# Patient Record
Sex: MALE | Race: WHITE | Employment: FULL TIME | URBAN - METROPOLITAN AREA
[De-identification: names, ages, dates, MRNs, and addresses within clinical notes are randomized per-mention and may not be internally consistent; named-entity substitution may affect disease eponyms.]

---

## 2017-11-13 ENCOUNTER — OFFICE VISIT (OUTPATIENT)
Dept: ORTHOPEDIC SURGERY | Age: 47
End: 2017-11-13

## 2017-11-13 VITALS — HEIGHT: 73 IN | BODY MASS INDEX: 21.59 KG/M2 | WEIGHT: 162.92 LBS

## 2017-11-13 DIAGNOSIS — R52 PAIN: Primary | ICD-10-CM

## 2017-11-13 DIAGNOSIS — M19.011 ARTHRITIS OF RIGHT ACROMIOCLAVICULAR JOINT: ICD-10-CM

## 2017-11-13 DIAGNOSIS — M75.101 TEAR OF RIGHT ROTATOR CUFF, UNSPECIFIED TEAR EXTENT: ICD-10-CM

## 2017-11-13 PROCEDURE — G8484 FLU IMMUNIZE NO ADMIN: HCPCS | Performed by: ORTHOPAEDIC SURGERY

## 2017-11-13 PROCEDURE — G8420 CALC BMI NORM PARAMETERS: HCPCS | Performed by: ORTHOPAEDIC SURGERY

## 2017-11-13 PROCEDURE — G8427 DOCREV CUR MEDS BY ELIG CLIN: HCPCS | Performed by: ORTHOPAEDIC SURGERY

## 2017-11-13 PROCEDURE — 99203 OFFICE O/P NEW LOW 30 MIN: CPT | Performed by: ORTHOPAEDIC SURGERY

## 2017-11-13 PROCEDURE — 1036F TOBACCO NON-USER: CPT | Performed by: ORTHOPAEDIC SURGERY

## 2017-11-13 NOTE — PROGRESS NOTES
file.    REVIEW OF SYSTEMS:   For new problems, a full review of systems will be found scanned in the patient's chart. CONSTITUTIONAL: Denies unexplained weight loss, fevers, chills   NEUROLOGICAL: Denies unsteady gait or progressive weakness  SKIN: Denies skin changes, delayed healing, rash, itching       PHYSICAL EXAM:    Vitals: Height 6' 0.99\" (1.854 m), weight 162 lb 14.7 oz (73.9 kg). GENERAL EXAM:  · General Apparence: Patient is adequately groomed with no evidence of malnutrition. · Orientation: The patient is oriented to time, place and person. · Mood & Affect:The patient's mood and affect are appropriate       Right shoulder PHYSICAL EXAMINATION:  · Inspection:  The patient is obvious a.c. joint arthritis with a bump you. He also has a small subacromial cyst.  The patient has a 2013 distal biceps tendon repair seems that some asymmetry of his biceps muscles. · Palpation:  Tender to palpation over both masses. · Range of Motion: He is got good range of motion with abduction external rotation and abduction internal rotation as well. · Strength: Normal    · Special Tests:  Mildly positive supraspinous sign. Clearly positive crossarm test.  Negative apprehension sign. · Skin:  There are no rashes, ulcerations or lesions. · Gait & station:       · Additional Examinations:        Left Upper Extremity: Examination of the left upper extremity does not show any tenderness, deformity or injury. Range of motion is unremarkable. There is no gross instability. There are no rashes, ulcerations or lesions. Strength and tone are normal.      Diagnostic Testing:      3 x-ray views of the right shoulder demonstrates fairly severe a.c. joint arthritis. The humeral joint is intact. Type II acromion process morphology.     Orders     Orders Placed This Encounter   Procedures    XR SHOULDER RIGHT (MIN 2 VIEWS)     [id number]     Order Specific Question:   Reason for exam:     Answer:   Pain Assessment / Treatment Plan:     1. Chronic right rotator cuff tendinitis with significant a.c. joint arthritis and a small anterior subacromial cyst.    2.  We discussed the findings at length. We'll going to go ahead and do an MRI scan of the right shoulder for further evaluation. 3. I have personally performed and/or participated in the history, exam and medical decision making and agree with all pertinent clinical information. I have also reviewed and agree with the past medical, family and social history unless otherwise noted. This dictation was performed with a verbal recognition program (DRAGON) and it was checked for errors. It is possible that there are still dictated errors within this office note. If so, please bring any errors to my attention for an addendum. All efforts were made to ensure that this office note is accurate.           Perez Goldman MD

## 2017-11-14 ENCOUNTER — TELEPHONE (OUTPATIENT)
Dept: ORTHOPEDIC SURGERY | Age: 47
End: 2017-11-14

## 2017-11-14 NOTE — TELEPHONE ENCOUNTER
CALLED PATIENT TODAY, STATING MRI IS APPROVED FOR [facility], & LEFT # FOR THEM TO CALL & SCHEDULE THAT. & TO MAKE AN FOLLOW UP APPT W/ DR. UGALDE AFTER MRI.  LNA

## 2017-11-20 ENCOUNTER — OFFICE VISIT (OUTPATIENT)
Dept: ORTHOPEDIC SURGERY | Age: 47
End: 2017-11-20

## 2017-11-20 VITALS
SYSTOLIC BLOOD PRESSURE: 123 MMHG | HEART RATE: 78 BPM | HEIGHT: 73 IN | DIASTOLIC BLOOD PRESSURE: 70 MMHG | BODY MASS INDEX: 21.59 KG/M2 | WEIGHT: 162.92 LBS

## 2017-11-20 DIAGNOSIS — M77.8 CAPSULITIS OF RIGHT SHOULDER: ICD-10-CM

## 2017-11-20 DIAGNOSIS — M19.011 ARTHRITIS OF RIGHT ACROMIOCLAVICULAR JOINT: Primary | ICD-10-CM

## 2017-11-20 DIAGNOSIS — M19.011 ARTHRITIS OF RIGHT SHOULDER REGION: Primary | ICD-10-CM

## 2017-11-20 DIAGNOSIS — M77.8 TENDINITIS OF RIGHT SHOULDER: ICD-10-CM

## 2017-11-20 PROCEDURE — G8420 CALC BMI NORM PARAMETERS: HCPCS | Performed by: ORTHOPAEDIC SURGERY

## 2017-11-20 PROCEDURE — G8484 FLU IMMUNIZE NO ADMIN: HCPCS | Performed by: ORTHOPAEDIC SURGERY

## 2017-11-20 PROCEDURE — L3670 SO ACRO/CLAV CAN WEB PRE OTS: HCPCS | Performed by: ORTHOPAEDIC SURGERY

## 2017-11-20 PROCEDURE — 99213 OFFICE O/P EST LOW 20 MIN: CPT | Performed by: ORTHOPAEDIC SURGERY

## 2017-11-20 PROCEDURE — 1036F TOBACCO NON-USER: CPT | Performed by: ORTHOPAEDIC SURGERY

## 2017-11-20 PROCEDURE — G8427 DOCREV CUR MEDS BY ELIG CLIN: HCPCS | Performed by: ORTHOPAEDIC SURGERY

## 2017-11-21 ENCOUNTER — TELEPHONE (OUTPATIENT)
Dept: ORTHOPEDIC SURGERY | Age: 47
End: 2017-11-21

## 2017-11-28 ENCOUNTER — HOSPITAL ENCOUNTER (OUTPATIENT)
Dept: SURGERY | Age: 47
Discharge: HOME | End: 2017-11-28
Attending: ORTHOPAEDIC SURGERY | Admitting: ORTHOPAEDIC SURGERY

## 2017-11-28 VITALS
OXYGEN SATURATION: 94 % | HEIGHT: 73 IN | DIASTOLIC BLOOD PRESSURE: 68 MMHG | BODY MASS INDEX: 23.19 KG/M2 | TEMPERATURE: 97 F | HEART RATE: 78 BPM | RESPIRATION RATE: 14 BRPM | WEIGHT: 175 LBS | SYSTOLIC BLOOD PRESSURE: 120 MMHG

## 2017-11-28 RX ORDER — ONDANSETRON 2 MG/ML
4 INJECTION INTRAMUSCULAR; INTRAVENOUS EVERY 10 MIN PRN
Status: DISCONTINUED | OUTPATIENT
Start: 2017-11-28 | End: 2017-11-29 | Stop reason: HOSPADM

## 2017-11-28 RX ORDER — LABETALOL HYDROCHLORIDE 5 MG/ML
5 INJECTION, SOLUTION INTRAVENOUS EVERY 10 MIN PRN
Status: DISCONTINUED | OUTPATIENT
Start: 2017-11-28 | End: 2017-11-29 | Stop reason: HOSPADM

## 2017-11-28 RX ORDER — LIDOCAINE HYDROCHLORIDE 10 MG/ML
1 INJECTION, SOLUTION EPIDURAL; INFILTRATION; INTRACAUDAL; PERINEURAL
Status: ACTIVE | OUTPATIENT
Start: 2017-11-28 | End: 2017-11-28

## 2017-11-28 RX ORDER — MEPERIDINE HYDROCHLORIDE 50 MG/ML
12.5 INJECTION INTRAMUSCULAR; INTRAVENOUS; SUBCUTANEOUS EVERY 5 MIN PRN
Status: DISCONTINUED | OUTPATIENT
Start: 2017-11-28 | End: 2017-11-29 | Stop reason: HOSPADM

## 2017-11-28 RX ORDER — SODIUM CHLORIDE, SODIUM LACTATE, POTASSIUM CHLORIDE, CALCIUM CHLORIDE 600; 310; 30; 20 MG/100ML; MG/100ML; MG/100ML; MG/100ML
INJECTION, SOLUTION INTRAVENOUS CONTINUOUS
Status: DISCONTINUED | OUTPATIENT
Start: 2017-11-28 | End: 2017-11-29 | Stop reason: HOSPADM

## 2017-11-28 RX ORDER — HYDROCODONE BITARTRATE AND ACETAMINOPHEN 5; 325 MG/1; MG/1
TABLET ORAL
Qty: 40 TABLET | Refills: 0 | Status: SHIPPED | OUTPATIENT
Start: 2017-11-28

## 2017-11-28 RX ORDER — MIDAZOLAM HYDROCHLORIDE 1 MG/ML
INJECTION INTRAMUSCULAR; INTRAVENOUS
Status: DISPENSED
Start: 2017-11-28 | End: 2017-11-28

## 2017-11-28 RX ORDER — HYDRALAZINE HYDROCHLORIDE 20 MG/ML
5 INJECTION INTRAMUSCULAR; INTRAVENOUS EVERY 10 MIN PRN
Status: DISCONTINUED | OUTPATIENT
Start: 2017-11-28 | End: 2017-11-29 | Stop reason: HOSPADM

## 2017-11-28 RX ORDER — TRAMADOL HYDROCHLORIDE 50 MG/1
50 TABLET ORAL EVERY 6 HOURS PRN
Qty: 40 TABLET | Refills: 0 | Status: SHIPPED | OUTPATIENT
Start: 2017-11-28 | End: 2017-12-13

## 2017-11-28 RX ADMIN — SODIUM CHLORIDE, SODIUM LACTATE, POTASSIUM CHLORIDE, CALCIUM CHLORIDE: 600; 310; 30; 20 INJECTION, SOLUTION INTRAVENOUS at 08:13

## 2017-11-28 RX ADMIN — ONDANSETRON 4 MG: 2 INJECTION INTRAMUSCULAR; INTRAVENOUS at 11:03

## 2017-11-28 ASSESSMENT — PAIN SCALES - GENERAL
PAINLEVEL_OUTOF10: 0

## 2017-11-28 ASSESSMENT — PAIN - FUNCTIONAL ASSESSMENT: PAIN_FUNCTIONAL_ASSESSMENT: 0-10

## 2017-11-28 NOTE — PROGRESS NOTES
Discharge in no distress: accompanied pt to passenger side of car with family/significant other driving. Resp WNL. Pt's significant other verbalized understanding of d/c instructions and verbalizes no additional questions. Denies pain.

## 2017-11-28 NOTE — ANESTHESIA PRE-OP
BP Readings from Last 3 Encounters:   11/28/17 111/79   11/20/17 123/70   08/31/13 114/85       NPO Status: Time of last liquid consumption: 2130                        Time of last solid consumption: 1900                        Date of last liquid consumption: 11/27/17                        Date of last solid food consumption: 11/27/17    BMI:   Wt Readings from Last 3 Encounters:   11/28/17 175 lb (79.4 kg)   11/21/17 175 lb (79.4 kg)   11/20/17 162 lb 14.7 oz (73.9 kg)     Body mass index is 23.09 kg/m². Anesthesia Evaluation  Patient summary reviewed   history of anesthetic complications: PONV. Airway: Mallampati: II  TM distance: >3 FB   Neck ROM: full  Mouth opening: > = 3 FB Dental: normal exam         Pulmonary:Negative Pulmonary ROS                              Cardiovascular:Negative CV ROS                      Neuro/Psych:   Negative Neuro/Psych ROS              GI/Hepatic/Renal: Neg GI/Hepatic/Renal ROS       (-) GERD and liver disease       Endo/Other: Negative Endo/Other ROS       (-) no Type II DM               Abdominal:           Vascular: negative vascular ROS. Medications:    Comments:    Prior to Admission medications    Not on File       Vitals   Vitals:    11/28/17 0817   BP: 111/79   Pulse: 65   Resp: 16   Temp: 97.2 °F (36.2 °C)   SpO2: 97%     BP Readings from Last 3 Encounters:   11/28/17 111/79   11/20/17 123/70   08/31/13 114/85     BMI  Ht Readings from Last 1 Encounters:   11/28/17 6' 1\" (1.854 m)     Wt Readings from Last 1 Encounters:   11/28/17 175 lb (79.4 kg)     Body mass index is 23.09 kg/m².   CBC   Lab Results   Component Value Date    WBC 13.3 08/31/2013    RBC 4.31 08/31/2013    HGB 13.5 08/31/2013    HCT 39.7 08/31/2013    MCV 92.1 08/31/2013    RDW 12.0 08/31/2013     08/31/2013     CMP    Lab Results   Component Value Date     08/31/2013    K 3.9 08/31/2013     08/31/2013

## 2017-11-28 NOTE — BRIEF OP NOTE
Brief Postoperative Note    Laura Cavanaugh  YOB: 1970  [id number]    Pre-operative Diagnosis: Right shoulder RTC tendonitis    Post-operative Diagnosis: Same    Procedure: Right shoulder arthroscopy, open removal small cyst    Anesthesia: General and Nerve Block    Surgeons/Assistants: Atul San MD, Liam JESUS    Estimated Blood Loss: less than 50     Complications: None    Specimens: Was Not Obtained    Findings: tendonitis    Electronically signed by ANN MARIE Robison on 11/28/2017 at 10:26 AM

## 2017-11-28 NOTE — PROGRESS NOTES
Wife at bedside discharge instructions already reviewed by Marco Lema with no questions. Patient sleeping no emesis.

## 2017-11-28 NOTE — ANESTHESIA POST-OP
Postoperative Anesthesia Note    Name:    Rafiq Alfaro  MRN:      [id number]    Patient Vitals for the past 12 hrs:   BP Temp Temp src Pulse Resp SpO2 Height Weight   11/28/17 1115 120/68 - - 78 14 94 % - -   11/28/17 1100 138/78 97 °F (36.1 °C) - 77 17 93 % - -   11/28/17 1042 130/70 97.5 °F (36.4 °C) - 82 16 93 % - -   11/28/17 1037 133/73 - - 83 16 92 % - -   11/28/17 1032 138/72 - - 86 16 92 % - -   11/28/17 1027 (!) 140/92 97.4 °F (36.3 °C) Temporal 85 16 92 % - -   11/28/17 0817 111/79 97.2 °F (36.2 °C) Temporal 65 16 97 % 6' 1\" (1.854 m) 175 lb (79.4 kg)        LABS:    CBC  Lab Results   Component Value Date/Time    WBC 13.3 (H) 08/31/2013 11:20 PM    HGB 13.5 08/31/2013 11:20 PM    HCT 39.7 (L) 08/31/2013 11:20 PM     08/31/2013 11:20 PM     RENAL  Lab Results   Component Value Date/Time     08/31/2013 11:20 PM    K 3.9 08/31/2013 11:20 PM     08/31/2013 11:20 PM    CO2 25 08/31/2013 11:20 PM    BUN 16 08/31/2013 11:20 PM    CREATININE 1.2 08/31/2013 11:20 PM    GLUCOSE 106 (H) 08/31/2013 11:20 PM     COAGS  No results found for: PROTIME, INR, APTT    Intake & Output: In: 80 [P.O.:10; I.V.:800]  Out: -     Nausea & Vomiting:  No    Level of Consciousness:  Awake    Pain Assessment:  Adequate analgesia    Anesthesia Complications:  No apparent anesthetic complications    SUMMARY      Vital signs stable  OK to discharge from Stage I post anesthesia care.   Care transferred from Anesthesiology department on discharge from perioperative area

## 2017-11-29 NOTE — OP NOTE
[address] [address]                                 OPERATIVE REPORT    PATIENT NAME: Kaila De La Cruz                        :        1970  MED REC NO:   [id number]                          ROOM:  ACCOUNT NO:   [de-identified]                          ADMIT DATE: 2017  PROVIDER:     Luis E Frances MD    DATE OF PROCEDURE:  2017    SERVICE:  Orthopedic surgery. SURGEON:  Bentley Morillo MD    FIRST ASSISTANT:  ANN MARIE Castillo    PREOPERATIVE DIAGNOSES:  1. Chronic rotator cuff tendinitis with impingement of the AC joint  arthritis. 2.  Small cyst/possible lipoma, right anterior shoulder. POSTOPERATIVE DIAGNOSES:  1. Chronic rotator cuff tendinitis with lateral arch stenosis. 2.  Severe AC joint arthritis of medial arch stenosis. 3.  Frayed anterior glenoid labrum and superior glenoid labrum. 4.  Small lipoma, anterior shoulder. OPERATION PERFORMED:  1. Exam under anesthesia and video arthroscopy, right shoulder. 2.  Arthroscopic Neer acromioplasty. 3.  Arthroscopic Compa procedure. 4.  Limited debridement of the shoulder including frayed anterior and  superior glenoid labrum without instability pattern. 5.  Open excision of lipoma, anterior aspect of the shoulder approximately  1 x 1 cm. ANESTHESIA:  General.    COMPLICATIONS:  None. COUNTS:  Sponge and needle counts correct. DISPOSITION:  To the recovery room. ESTIMATED BLOOD LOSS:  Negligible. POSITION:  Left lateral decubitus with Vac-Pac, superficial bony prominence  carefully padded and axillary roll. A 10 pounds longitudinal traction on  the Ronci system. INDICATIONS FOR SURGERY:  The patient is a 70-year-old gentleman, who has  been struggling with ongoing right shoulder pain. His clinical examination  and MRI scan was consistent with the aforementioned diagnosis.   He had  significant AC joint arthritis without evidence of a full-thickness rotator  cuff tear. He also had a small mass on the anterior aspect of his shoulder  that appeared benign. It is probably either cyst or lipoma and he  requested this to be removed. At the time of the surgery, I told him that  this could be performed. We discussed the risks, benefits, and potential  complications of the operation as well as a normal rehabilitative protocol. He realized concerns regarding infection, deep vein thrombosis, pulmonary  embolism, arthrofibrosis, delayed rehabilitation, anesthetic complications,  death, cardiopulmonary issues, etc.  All questions were answered. I did  blas this man's shoulder in the preprocedure holding area and circled what  was felt to be the mass. DETAILS OF SURGERY:  The patient was taken to the operating room and placed  on the operating table in supine position. General anesthesia was induced  and maintained without difficulty. Examination under anesthesia did not  demonstrate any signs of adhesive capsulitis or instability. The shoulder  was then positioned and prepped and draped in a routine fashion for this  type of surgery. We excised the cyst first.  A longitudinal incision was  made over the mass. This was carried down through the skin and soft  tissues to expose the small lipoma. I utilized a Humphries tenotomy scissors  to dissect around the lipoma and removed this off the fascia of the deltoid  without difficulty. This totally benign in appearance. Vicryl was then utilized to close this. At this point, the arthroscopic  portals were established and the glenohumeral joint was entered. The  glenohumeral articular surface was okay, but the patient did have the  extensor fraying of the anterior labrum and the superior labrum. Each of  these areas were debrided utilizing a 4.0 full-radius resector through an  anterior portal.  The biceps and biceps anchor was intact.   There was no  Hill-Sachs lesion and the

## 2017-11-30 ENCOUNTER — HOSPITAL ENCOUNTER (OUTPATIENT)
Dept: PHYSICAL THERAPY | Age: 47
Discharge: HOME | End: 2017-11-30
Admitting: ORTHOPAEDIC SURGERY

## 2017-11-30 NOTE — FLOWSHEET NOTE
[address] [facility]    Physical Therapy Daily Treatment Note  Date:  2017    Patient Name:  Javon Joy    :  1970  MRN: [id number]  Medical/Treatment Diagnosis Information:  · Diagnosis: R shoulder Compa / Neer procedure on 17  · Treatment Diagnosis: W58.53  Insurance/Certification information:  PT Insurance Information: [facility]  Physician Information:  Referring Practitioner: Jessica Garcia of care signed (Y/N):     Date of Patient follow up with Physician:  17    G-Code (if applicable):      Date G-Code Applied:  2017  PT G-Codes  Functional Assessment Tool Used: Norman Patel  Score: 100%  Functional Limitation: Carrying, moving and handling objects  Carrying, Moving and Handling Objects Current Status (): 100 percent impaired, limited or restricted  Carrying, Moving and Handling Objects Goal Status (): 0 percent impaired, limited or restricted    Progress Note: [x]  Yes  []  No      Latex Allergy:  [x]NO      []YES   Preferred Language for Healthcare:   [x]English       []other:     Visit # Insurance Allowable   1 Med nec     Pain level:  7/10     SUBJECTIVE:  See eval    OBJECTIVE: See eval  Observation:   Test measurements:    Patient educated on following:    RESTRICTIONS/PRECAUTIONS:     Exercises/Interventions:   Therapeutic Ex Wt/Sets/Reps/Hold Notes   Pulley                         Shrugs/pinches x20 ea    Self passive flexion/ER x20 ea    Pendulums x20 ea ??, ??, CW/CCW                                                      Manual     Gr I-II mobs for tissue reactivity     PROM-all planes     GR III-IV mobs for arthrokinematics     Cervical/long axis distraction     Thoracic PA mobs     PNF for strengthening     PNF for agonist/antagonist inhibition          Pt education 10 min Provided Education in post-operative precautions/contraindications. Provided instruction in shoulder safe zone priniciples.       Therapeutic Exercise and NMR EXR  [x] (87140) Provided verbal/tactile cueing for activities related to strengthening, flexibility, endurance, ROM  for improvements in scapular, scapulothoracic and UE control with self care, reaching, carrying, lifting, house/yardwork, driving/computer work. [x] (82996) Provided verbal/tactile cueing for activities related to improving balance, coordination, kinesthetic sense, posture, motor skill, proprioception  to assist with  scapular, scapulothoracic and UE control with self care, reaching, carrying, lifting, house/yardwork, driving/computer work.     Home Exercise Program:    [x] (37588) Reviewed/Progressed HEP activities related to strengthening, flexibility, endurance, ROM of scapular, scapulothoracic and UE control with self care, reaching, carrying, lifting, house/yardwork, driving/computer work  [x] (84210) Reviewed/Progressed HEP activities related to improving balance, coordination, kinesthetic sense, posture, motor skill, proprioception of scapular, scapulothoracic and UE control with self care, reaching, carrying, lifting, house/yardwork, driving/computer work      Manual Treatments:  PROM / STM / Oscillations-Mobs:  G-I, II, III, IV (PA's, Inf., Post.)  [x] (91905) Provided manual therapy to mobilize soft tissue/joints of cervical/CT, scapular GHJ and UE for the purpose of modulating pain, promoting relaxation,  increasing ROM, reducing/eliminating soft tissue swelling/inflammation/restriction, improving soft tissue extensibility and allowing for proper ROM for normal function with self care, reaching, carrying, lifting, house/yardwork, driving/computer work    Modalities:  Cp at home    Charges:  Timed Code Treatment Minutes: 30   Total Treatment Minutes: 50     [x] EVAL (LOW) 98054 (typically 20 minutes face-to-face)  [] EVAL (MOD) 44701 (typically 30 minutes face-to-face)  [] EVAL (HIGH) 26499 (typically 45 minutes face-to-face)  [] RE-EVAL     [x] WL(75033) x  2   [] Ionto  [] NMR (66699) x      [] Vaso  [] Manual (47808) x       [] Mech Traction  [] ES (unattended):   [] Other:     GOALS:  Therapist goals for Patient:   Short Term Goals: To be achieved in: 2 weeks  1. Independent in HEP and progression per patient tolerance, in order to prevent re-injury. 2. Patient will have a decrease in pain to facilitate improvement in movement, function, and ADLs as indicated by Functional Deficits.     Long Term Goals: To be achieved in: 12 weeks  1. Disability index score of 10% or less for the Quick Dash to assist with reaching prior level of function. 2. Patient will demonstrate increased AROM to equal the opposite side bilaterally to allow for proper joint functioning as indicated by patients Functional Deficits. 3. Patient will demonstrate an increase in strength to UE MMT scores of 5/5 to allow for proper functional mobility as indicated by patients Functional Deficits. 4. Patient will return to all transfers, work activities, and functional activities without increased symptoms or restriction. 5. Patient will have 0/10 pain with ADL's.  6. Patient stated goal: Return to work in 6-8 weeks per patient. Goals that are underlined signify the goal has been accomplished. Progression Towards Functional goals:  [] Patient is progressing as expected towards functional goals listed. [] Progression is slowed due to complexities listed. [] Progression has been slowed due to co-morbidities.   [x] Plan just implemented, too soon to assess goals progression  [] Other:     ASSESSMENT:  See eval    Treatment/Activity Tolerance:   [x] Patient tolerated treatment well [] Patient limited by fatique  [] Patient limited by pain  [] Patient limited by other medical complications  [] Other:     Prognosis: [] Good [] Fair  [] Poor    Patient Requires Follow-up: [x] Yes  [] No    PLAN: See eval  [] Continue per plan of care [] Alter current plan (see comments)  [x] Plan of care initiated [] Hold pending MD visit [] Discharge    Electronically signed by: Jaz Holt, PT

## 2017-11-30 NOTE — PLAN OF CARE
by intake and observation. Intake form has been scanned into medical record. Patient has been instructed to contact their primary care physician regarding ROS issues if not already being addressed at this time. Co-morbidities/Complexities (which will affect course of rehabilitation):   []None           Arthritic conditions   []Rheumatoid arthritis (M05.9)  []Osteoarthritis (M19.91)   Cardiovascular conditions   []Hypertension (I10)  []Hyperlipidemia (E78.5)  []Angina pectoris (I20)  []Atherosclerosis (I70)   Musculoskeletal conditions   []Disc pathology   []Congenital spine pathologies   [x]Prior surgical intervention  []Osteoporosis (M81.8)  []Osteopenia (M85.8)   Endocrine conditions   []Hypothyroid (E03.9)  []Hyperthyroid Gastrointestinal conditions   []Constipation (P47.62)   Metabolic conditions   []Morbid obesity (E66.01)  []Diabetes type 1(E10.65) or 2 (E11.65)   []Neuropathy (G60.9)     Pulmonary conditions   []Asthma (J45)  []Coughing   []COPD (J44.9)   Psychological Disorders  []Anxiety (F41.9)  []Depression (F32.9)   []Other:   []Other:          Barriers to/and or personal factors that will affect rehab potential:              []Age  []Sex              []Motivation/Lack of Motivation                        []Co-Morbidities              []Cognitive Function, education/learning barriers              []Environmental, home barriers              []profession/work barriers  []past PT/medical experience  []other:  Justification:     Falls Risk Assessment (30 days):   [x] Falls Risk assessed and no intervention required.   [] Falls Risk assessed and Patient requires intervention due to being higher risk   TUG score (>12s at risk):     [] Falls education provided, including       G-Codes:  PT G-Codes  Functional Assessment Tool Used: Quick Dash  Score: 100%  Functional Limitation: Carrying, moving and handling objects  Carrying, Moving and Handling Objects Current Status (): 100 percent impaired, limited or restricted  Carrying, Moving and Handling Objects Goal Status (): 0 percent impaired, limited or restricted    ASSESSMENT:   Functional Impairments   []Noted spinal or UE joint hypomobility   []Noted spinal or UE joint hypermobility   [x]Decreased UE functional ROM   [x]Decreased UE functional strength   [x]Abnormal reflexes/sensation/myotomal/dermatomal deficits   [x]Decreased RC/scapular/core strength and neuromuscular control   []other:      Functional Activity Limitations (from functional questionnaire and intake)   []Reduced ability to tolerate prolonged functional positions   []Reduced ability or difficulty with changes of positions or transfers between positions   []Reduced ability to maintain good posture and demonstrate good body mechanics with sitting, bending, and lifting   [x] Reduced ability or tolerance with driving and/or computer work   [x]Reduced ability to sleep   [x]Reduced ability to perform lifting, reaching, carrying tasks   [x]Reduced ability to tolerate impact through UE   [x]Reduced ability to reach behind back   [x]Reduced ability to  or hold objects   [x]Reduced ability to throw or toss an object   []other:    Participation Restrictions   []Reduced participation in self care activities   [x]Reduced participation in home management activities   [x]Reduced participation in work activities   [x]Reduced participation in social activities. []Reduced participation in sport/recreation activities. Classification:   [x]Signs/symptoms consistent with post-surgical status including decreased ROM, strength and function.   []Signs/symptoms consistent with joint sprain/strain   []Signs/symptoms consistent with shoulder impingement   []Signs/symptoms consistent with shoulder/elbow/wrist tendinopathy   []Signs/symptoms consistent with Rotator cuff tear   []Signs/symptoms consistent with labral tear   []Signs/symptoms consistent with postural dysfunction    []Signs/symptoms consistent with Glenohumeral IR Deficit - <45 degrees   []Signs/symptoms consistent with facet dysfunction of cervical/thoracic spine    []Signs/symptoms consistent with pathology which may benefit from Dry needling     []other: Tolerance of evaluation/treatment:    []Excellent   [x]Good    []Fair   []Poor    Physical Therapy Evaluation Complexity Justification   [x] A history of present problem with:  [] no personal factors and/or comorbidities that impact the plan of care;  [x]1-2 personal factors and/or comorbidities that impact the plan of care  []3 personal factors and/or comorbidities that impact the plan of care  [x] An examination of body systems using standardized tests and measures addressing any of the following: body structures and functions (impairments), activity limitations, and/or participation restrictions;:  [] a total of 1-2 or more elements   [x] a total of 3 or more elements   [] a total of 4 or more elements   [x] A clinical presentation with:  [x] stable and/or uncomplicated characteristics   [] evolving clinical presentation with changing characteristics  [] unstable and unpredictable characteristics;   [x] Clinical decision making of [x] low, [] moderate, [] high complexity using standardized patient assessment instrument and/or measurable assessment of functional outcome. [x] EVAL (LOW) 12394 (typically 20 minutes face-to-face)  [] EVAL (MOD) 20599 (typically 30 minutes face-to-face)  [] EVAL (HIGH) 90293 (typically 45 minutes face-to-face)  [] RE-EVAL     PLAN:  Frequency/Duration:  1-2 days per week for 12 weeks:  INTERVENTIONS:  1. Therapeutic exercise including: strength training, ROM, NMR and proprioception for the scapula, core and Upper extremity  2. Manual therapy as indicated including Dry Needling/IASTM, STM, PROM, Gr I-IV mobilizations, spinal mobilization/manipulation. 3. Modalities as needed including: thermal agents, E-stim, US, iontophoresis as indicated.    4. Patient education on

## 2017-12-01 ENCOUNTER — OFFICE VISIT (OUTPATIENT)
Dept: ORTHOPEDIC SURGERY | Age: 47
End: 2017-12-01

## 2017-12-01 ENCOUNTER — HOSPITAL ENCOUNTER (OUTPATIENT)
Dept: PHYSICAL THERAPY | Age: 47
Discharge: HOME | End: 2017-12-31
Attending: ORTHOPAEDIC SURGERY | Admitting: ORTHOPAEDIC SURGERY

## 2017-12-01 DIAGNOSIS — Z98.890 S/P ARTHROSCOPY OF SHOULDER: Primary | ICD-10-CM

## 2017-12-01 PROCEDURE — 99024 POSTOP FOLLOW-UP VISIT: CPT | Performed by: PHYSICIAN ASSISTANT

## 2017-12-01 NOTE — PROGRESS NOTES
History of present illness: The patient returns today for their first postoperative visit after right shoulder arthroscopy performed on 11/28/2017 with Neer acromioplasty, Compa procedure and lipoma removal. Pain control has been satisfactory with oral medications. There have been no fevers or chills. Physical examination: inspection reveals expected swelling. Incisions are clean, dry, and intact. No signs of infection. Range of motion limited by pain and swelling. The patient is neurovascularly intact. Assessment/plan:  The patient is doing well after shoulder arthroscopy. Surgical findings were reviewed with the patient. I have recommended ice, judicious use of NSAIDs with GI precautions, and physical therapy for both range of motion and strength. They can begin to wean themselves out of their sling. We will see the patient back in 3 weeks with x-rays. They've verbalized good understanding of the plan. Madeleine Patrick, [facility]    This dictation was performed with a verbal recognition program [facility]) and it was checked for errors. It is possible that there are still dictated errors within this office note. If so, please bring any errors to my attention for an addendum. All efforts were made to ensure that this office note is accurate.

## 2017-12-04 ENCOUNTER — HOSPITAL ENCOUNTER (OUTPATIENT)
Dept: PHYSICAL THERAPY | Age: 47
Discharge: HOME | End: 2017-12-04
Admitting: ORTHOPAEDIC SURGERY

## 2017-12-04 NOTE — FLOWSHEET NOTE
for improvements in scapular, scapulothoracic and UE control with self care, reaching, carrying, lifting, house/yardwork, driving/computer work. [x] (69817) Provided verbal/tactile cueing for activities related to improving balance, coordination, kinesthetic sense, posture, motor skill, proprioception  to assist with  scapular, scapulothoracic and UE control with self care, reaching, carrying, lifting, house/yardwork, driving/computer work.     Home Exercise Program:    [x] (60762) Reviewed/Progressed HEP activities related to strengthening, flexibility, endurance, ROM of scapular, scapulothoracic and UE control with self care, reaching, carrying, lifting, house/yardwork, driving/computer work  [x] (50122) Reviewed/Progressed HEP activities related to improving balance, coordination, kinesthetic sense, posture, motor skill, proprioception of scapular, scapulothoracic and UE control with self care, reaching, carrying, lifting, house/yardwork, driving/computer work      Manual Treatments:  PROM / STM / Oscillations-Mobs:  G-I, II, III, IV (PA's, Inf., Post.)  [x] (26319) Provided manual therapy to mobilize soft tissue/joints of cervical/CT, scapular GHJ and UE for the purpose of modulating pain, promoting relaxation,  increasing ROM, reducing/eliminating soft tissue swelling/inflammation/restriction, improving soft tissue extensibility and allowing for proper ROM for normal function with self care, reaching, carrying, lifting, house/yardwork, driving/computer work    Modalities:  Cp at home    Charges:  Timed Code Treatment Minutes: 45   Total Treatment Minutes: 45     [] EVAL (LOW) 20859 (typically 20 minutes face-to-face)  [] EVAL (MOD) 71834 (typically 30 minutes face-to-face)  [] EVAL (HIGH) 09191 (typically 45 minutes face-to-face)  [] RE-EVAL     [x] ZU(12928) x  2   [] Ionto  [x] NMR (05449) x  1   [] Vaso  [] Manual ([id number]) x       [] Mech Traction  [] ES (unattended):   [] Other:     GOALS:  Therapist goals for Patient:   Short Term Goals: To be achieved in: 2 weeks  1. Independent in HEP and progression per patient tolerance, in order to prevent re-injury. 2. Patient will have a decrease in pain to facilitate improvement in movement, function, and ADLs as indicated by Functional Deficits.     Long Term Goals: To be achieved in: 12 weeks  1. Disability index score of 10% or less for the Quick Dash to assist with reaching prior level of function. 2. Patient will demonstrate increased AROM to equal the opposite side bilaterally to allow for proper joint functioning as indicated by patients Functional Deficits. 3. Patient will demonstrate an increase in strength to UE MMT scores of 5/5 to allow for proper functional mobility as indicated by patients Functional Deficits. 4. Patient will return to all transfers, work activities, and functional activities without increased symptoms or restriction. 5. Patient will have 0/10 pain with ADL's.  6. Patient stated goal: Return to work in 6-8 weeks per patient. Goals that are underlined signify the goal has been accomplished. Progression Towards Functional goals:  [] Patient is progressing as expected towards functional goals listed. [] Progression is slowed due to complexities listed. [] Progression has been slowed due to co-morbidities.   [x] Plan just implemented, too soon to assess goals progression  [] Other:     ASSESSMENT:  See eval     Treatment/Activity Tolerance:   [x] Patient tolerated treatment well [] Patient limited by fatique  [] Patient limited by pain  [] Patient limited by other medical complications  [] Other:     Prognosis: [] Good [] Fair  [] Poor    Patient Requires Follow-up: [x] Yes  [] No    PLAN: See eval  [x] Continue per plan of care [] Alter current plan (see comments)  [] Plan of care initiated [] Hold pending MD visit [] Discharge    Electronically signed by: Cori Castro, PT

## 2017-12-06 ENCOUNTER — HOSPITAL ENCOUNTER (OUTPATIENT)
Dept: PHYSICAL THERAPY | Age: 47
Discharge: HOME | End: 2017-12-06
Admitting: ORTHOPAEDIC SURGERY

## 2017-12-06 NOTE — FLOWSHEET NOTE
to strengthening, flexibility, endurance, ROM  for improvements in scapular, scapulothoracic and UE control with self care, reaching, carrying, lifting, house/yardwork, driving/computer work. [x] (02792) Provided verbal/tactile cueing for activities related to improving balance, coordination, kinesthetic sense, posture, motor skill, proprioception  to assist with  scapular, scapulothoracic and UE control with self care, reaching, carrying, lifting, house/yardwork, driving/computer work.     Home Exercise Program:    [x] (26083) Reviewed/Progressed HEP activities related to strengthening, flexibility, endurance, ROM of scapular, scapulothoracic and UE control with self care, reaching, carrying, lifting, house/yardwork, driving/computer work  [x] (12510) Reviewed/Progressed HEP activities related to improving balance, coordination, kinesthetic sense, posture, motor skill, proprioception of scapular, scapulothoracic and UE control with self care, reaching, carrying, lifting, house/yardwork, driving/computer work      Manual Treatments:  PROM / STM / Oscillations-Mobs:  G-I, II, III, IV (PA's, Inf., Post.)  [x] (46270) Provided manual therapy to mobilize soft tissue/joints of cervical/CT, scapular GHJ and UE for the purpose of modulating pain, promoting relaxation,  increasing ROM, reducing/eliminating soft tissue swelling/inflammation/restriction, improving soft tissue extensibility and allowing for proper ROM for normal function with self care, reaching, carrying, lifting, house/yardwork, driving/computer work    Modalities:  Cp 10'  Charges:  Timed Code Treatment Minutes: 55   Total Treatment Minutes: 65     [] EVAL (LOW) 94022 (typically 20 minutes face-to-face)  [] EVAL (MOD) 79076 (typically 30 minutes face-to-face)  [] EVAL (HIGH) 39912 (typically 45 minutes face-to-face)  [] RE-EVAL     [x] ZF(84230) x  2   [] Ionto  [x] NMR (59991) x  1   [] Vaso  [x] Manual (82928) x  1    [] Mech Traction  [] ES

## 2017-12-11 ENCOUNTER — HOSPITAL ENCOUNTER (OUTPATIENT)
Dept: PHYSICAL THERAPY | Age: 47
Discharge: HOME | End: 2017-12-11
Admitting: ORTHOPAEDIC SURGERY

## 2017-12-11 NOTE — FLOWSHEET NOTE
[address] and [facility] [place])    Physical Therapy Daily Treatment Note  Date:  2017    Patient Name:  Federico Crawford  \"Fernando\"  :  1970  MRN: [id number]  Medical/Treatment Diagnosis Information:  · Diagnosis: R shoulder Compa / Neer procedure on 17  · Treatment Diagnosis: J89.79  Insurance/Certification information:  PT Insurance Information: [facility]  Physician Information:  Referring Practitioner: Kassidy Pate of care signed (Y/N):     Date of Patient follow up with Physician:  17    G-Code (if applicable):      Date G-Code Applied:  2017       Progress Note: [x]  Yes  []  No      Latex Allergy:  [x]NO      []YES   Preferred Language for Healthcare:   [x]English       []other:     Visit # Insurance Allowable   4 Med nec     Pain level:  3-4/10     SUBJECTIVE:  Patient reports that he has had extreme pain while trying to do the cane press.       OBJECTIVE: See eval  Observation:   Test measurements:    Shoulder flexion: 165 °        Shoulder ER: 65 °   Patient educated on following:    RESTRICTIONS/PRECAUTIONS:     Exercises/Interventions:   Therapeutic Ex Wt/Sets/Reps/Hold Notes   Pulley 5 min    Wall slides x10    Cross arm stretch 5x20\"    IR belt stretch 5x10\"              T-Band Rows  YTB x20    T Band Ext YTB x20    TB IR/ER YL x20 / Walking iso YL 5x10\"              Shrugs/pinches    Self passive flexion/ER    Pendulums ??, ??, CW/CCW         Ball on trampolene  X20 4 way     SB flexion on table  X20 5\" hold         Cane supine flexion/ER 10x10\" [employer]   Unable due to extreme pain        Bent over row/ext x10 ea    SL ER x10 With assistance        4 way shoulder Iso's  HEP        Pt education Counseled in decreasing guarding mechanisms and allowing to relax with normal gait/arm swing pattern, shoulder safe zone, and lifting expectations  x10'    Manual     Gr I-II mobs for tissue reactivity     PROM-all planes 10' min supine ER   GR III-IV mobs for arthrokinematics     Cervical/long axis distraction     Thoracic PA mobs     PNF for strengthening     PNF for agonist/antagonist inhibition          Pt education 10 min Provided Education in post-operative precautions/contraindications. Provided instruction in shoulder safe zone priniciples. Therapeutic Exercise and NMR EXR  [x] (29203) Provided verbal/tactile cueing for activities related to strengthening, flexibility, endurance, ROM  for improvements in scapular, scapulothoracic and UE control with self care, reaching, carrying, lifting, house/yardwork, driving/computer work. [x] (49630) Provided verbal/tactile cueing for activities related to improving balance, coordination, kinesthetic sense, posture, motor skill, proprioception  to assist with  scapular, scapulothoracic and UE control with self care, reaching, carrying, lifting, house/yardwork, driving/computer work.     Home Exercise Program:    [x] (85956) Reviewed/Progressed HEP activities related to strengthening, flexibility, endurance, ROM of scapular, scapulothoracic and UE control with self care, reaching, carrying, lifting, house/yardwork, driving/computer work  [x] (37132) Reviewed/Progressed HEP activities related to improving balance, coordination, kinesthetic sense, posture, motor skill, proprioception of scapular, scapulothoracic and UE control with self care, reaching, carrying, lifting, house/yardwork, driving/computer work      Manual Treatments:  PROM / STM / Oscillations-Mobs:  G-I, II, III, IV (PA's, Inf., Post.)  [x] (50728) Provided manual therapy to mobilize soft tissue/joints of cervical/CT, scapular GHJ and UE for the purpose of modulating pain, promoting relaxation,  increasing ROM, reducing/eliminating soft tissue swelling/inflammation/restriction, improving soft tissue extensibility and allowing for proper ROM for normal function with self care, reaching, carrying, lifting, house/yardwork, driving/computer treatment well [] Patient limited by fatique  [] Patient limited by pain  [] Patient limited by other medical complications  [] Other:     Prognosis: [] Good [] Fair  [] Poor    Patient Requires Follow-up: [x] Yes  [] No    PLAN: See eval  [x] Continue per plan of care [] Alter current plan (see comments)  [] Plan of care initiated [] Hold pending MD visit [] Discharge    Electronically signed by: Darwin Naqvi PTA

## 2017-12-13 ENCOUNTER — TELEPHONE (OUTPATIENT)
Dept: ORTHOPEDIC SURGERY | Age: 47
End: 2017-12-13

## 2017-12-13 ENCOUNTER — HOSPITAL ENCOUNTER (OUTPATIENT)
Dept: PHYSICAL THERAPY | Age: 47
Discharge: HOME | End: 2017-12-13
Admitting: ORTHOPAEDIC SURGERY

## 2017-12-13 NOTE — TELEPHONE ENCOUNTER
The patient called today with concerns about the tramadol medication he was given for his shoulder pain. He states that he's been noticing when he stands up from a seated position, he gets dizzy. He attributes this to the Tramadol. I have encouraged him to increase his fluids and be cautious about the amount of narcotic pain medication he is taking. I told him that this could be a side effect of the narcotic but he states he believes it's from the tramadol. I recommended that he discontinue the tramadol and instead use ibuprofen with his hydrocodone for breakthrough pain. He will need to be weaning off of his hydrocodone over the next couple of weeks. He is in agree with this plan. If he continues to note this, he will need to discontinue the hydrocodone.     Magaly JESUS

## 2017-12-13 NOTE — FLOWSHEET NOTE
Cervical/long axis distraction     Thoracic PA mobs     PNF for strengthening     PNF for agonist/antagonist inhibition          Pt education 10 min Provided Education in post-operative precautions/contraindications. Provided instruction in shoulder safe zone priniciples. Therapeutic Exercise and NMR EXR  [x] (63014) Provided verbal/tactile cueing for activities related to strengthening, flexibility, endurance, ROM  for improvements in scapular, scapulothoracic and UE control with self care, reaching, carrying, lifting, house/yardwork, driving/computer work. [x] (82241) Provided verbal/tactile cueing for activities related to improving balance, coordination, kinesthetic sense, posture, motor skill, proprioception  to assist with  scapular, scapulothoracic and UE control with self care, reaching, carrying, lifting, house/yardwork, driving/computer work.     Home Exercise Program:    [x] (78967) Reviewed/Progressed HEP activities related to strengthening, flexibility, endurance, ROM of scapular, scapulothoracic and UE control with self care, reaching, carrying, lifting, house/yardwork, driving/computer work  [x] (39792) Reviewed/Progressed HEP activities related to improving balance, coordination, kinesthetic sense, posture, motor skill, proprioception of scapular, scapulothoracic and UE control with self care, reaching, carrying, lifting, house/yardwork, driving/computer work      Manual Treatments:  PROM / STM / Oscillations-Mobs:  G-I, II, III, IV (PA's, Inf., Post.)  [x] (01147) Provided manual therapy to mobilize soft tissue/joints of cervical/CT, scapular GHJ and UE for the purpose of modulating pain, promoting relaxation,  increasing ROM, reducing/eliminating soft tissue swelling/inflammation/restriction, improving soft tissue extensibility and allowing for proper ROM for normal function with self care, reaching, carrying, lifting, house/yardwork, driving/computer work    Modalities:  Cp 10'  Charges:  Timed Code Treatment Minutes: 55   Total Treatment Minutes: 65     [] EVAL (LOW) 10474 (typically 20 minutes face-to-face)  [] EVAL (MOD) 49313 (typically 30 minutes face-to-face)  [] EVAL (HIGH) 65034 (typically 45 minutes face-to-face)  [] RE-EVAL     [x] KO(53691) x  2   [] Ionto  [x] NMR (98727) x  1   [] Vaso  [x] Manual (84208) x  1    [] Mech Traction  [] ES (unattended):   [] Other:     GOALS:  Therapist goals for Patient:   Short Term Goals: To be achieved in: 2 weeks  1. Independent in HEP and progression per patient tolerance, in order to prevent re-injury. 2. Patient will have a decrease in pain to facilitate improvement in movement, function, and ADLs as indicated by Functional Deficits.     Long Term Goals: To be achieved in: 12 weeks  1. Disability index score of 10% or less for the Quick Dash to assist with reaching prior level of function. 2. Patient will demonstrate increased AROM to equal the opposite side bilaterally to allow for proper joint functioning as indicated by patients Functional Deficits. 3. Patient will demonstrate an increase in strength to UE MMT scores of 5/5 to allow for proper functional mobility as indicated by patients Functional Deficits. 4. Patient will return to all transfers, work activities, and functional activities without increased symptoms or restriction. 5. Patient will have 0/10 pain with ADL's.  6. Patient stated goal: Return to work in 6-8 weeks per patient. Goals that are underlined signify the goal has been accomplished. Progression Towards Functional goals:  [x] Patient is progressing as expected towards functional goals listed. [] Progression is slowed due to complexities listed. [] Progression has been slowed due to co-morbidities.   [] Plan just implemented, too soon to assess goals progression  [] Other:     ASSESSMENT:  See eval     Treatment/Activity Tolerance:   [x] Patient tolerated treatment well [] Patient

## 2017-12-18 ENCOUNTER — HOSPITAL ENCOUNTER (OUTPATIENT)
Dept: PHYSICAL THERAPY | Age: 47
Discharge: HOME | End: 2017-12-18
Admitting: ORTHOPAEDIC SURGERY

## 2017-12-18 NOTE — FLOWSHEET NOTE
[address] [place] [address] and [facility])    Physical Therapy Daily Treatment Note  Date:  2017    Patient Name:  Aguila Johnson  \"Fernando\"  :  1970  MRN: [id number]  Medical/Treatment Diagnosis Information:  · Diagnosis: R shoulder Compa / Neer procedure on 17  · Treatment Diagnosis: P23.95  Insurance/Certification information:  PT Insurance Information: [facility]  Physician Information:  Referring Practitioner: Jutsin Lord of care signed (Y/N):     Date of Patient follow up with Physician:  17    G-Code (if applicable):      Date G-Code Applied:  2017       Progress Note: [x]  Yes  []  No      Latex Allergy:  [x]NO      []YES   Preferred Language for Healthcare:   [x]English       []other:     Visit # Insurance Allowable   6 Med nec     Pain level:  5.5/10     SUBJECTIVE:   was a good day per patient, but Saturday was a painful day.      OBJECTIVE: See eval  Observation:   Test measurements:    Shoulder flexion: 170 °        Shoulder ER: 90 °   Patient educated on following:    RESTRICTIONS/PRECAUTIONS:     Exercises/Interventions:   Therapeutic Ex Wt/Sets/Reps/Hold Notes   Pulley 5 min    Wall slides x10    Cross arm stretch 5x20\"    IR belt stretch 5x10\"    Sleeper stretch 5x10\"         T-Band Rows  YTB x30    T Band Ext YTB x30    TB IR/ER YL x30 / YL x30    TB B ER YL x 20                     Wall push ups     Ball on trampolene  X20 4 way     SB flexion on table  X20 5\" hold         Cane supine flexion/ER 10x10\" ea    [employer]  x20     Supine flexion x10                        Bent over row/ext x15 ea    SL ER  SL abd x20  x10 With assistance        4 way shoulder Iso's  HEP        Pt education Counseled in decreasing guarding mechanisms and allowing to relax with normal gait/arm swing pattern, shoulder safe zone, and lifting expectations  x10'    Manual     Gr I-II mobs for tissue reactivity     PROM-all planes 10' min supine ER   GR III-IV mobs for

## 2017-12-20 ENCOUNTER — HOSPITAL ENCOUNTER (OUTPATIENT)
Dept: PHYSICAL THERAPY | Age: 47
Discharge: HOME | End: 2017-12-20
Admitting: ORTHOPAEDIC SURGERY

## 2017-12-20 NOTE — FLOWSHEET NOTE
[address] and [facility]    Physical Therapy Daily Treatment Note  Date:  2017    Patient Name:  Kym Eid  \"Fernando\"  :  1970  MRN: [id number]  Medical/Treatment Diagnosis Information:  · Diagnosis: R shoulder Compa / Neer procedure on 17  · Treatment Diagnosis: N27.91  Insurance/Certification information:  PT Insurance Information: [facility]  Physician Information:  Referring Practitioner: Luma Hale of care signed (Y/N):     Date of Patient follow up with Physician:  17    G-Code (if applicable):      Date G-Code Applied:  2017       Progress Note: [x]  Yes  []  No      Latex Allergy:  [x]NO      []YES   Preferred Language for Healthcare:   [x]English       []other:     Visit # Insurance Allowable   7 Med nec     Pain level:  5.5/10     SUBJECTIVE:  Doing better. Less pain on the whole.     OBJECTIVE: See eval  Observation:   Test measurements:    Shoulder flexion: 170 °        Shoulder ER: 90 °   Patient educated on following:    RESTRICTIONS/PRECAUTIONS:     Exercises/Interventions:   Therapeutic Ex Wt/Sets/Reps/Hold Notes   Pulley 5 min    Wall slides x10    Cross arm stretch 5x20\"    IR belt stretch 5x10\"    Sleeper stretch 5x10\"         T-Band Rows  YTB x30    T Band Ext YTB x30    TB IR/ER YL x30 / YL x30    TB B ER YL x 20                     Wall push ups     Ball on trampolene  X20 4 way     SB flexion on table  X20 5\" hold         Cane supine flexion/ER 10x10\" ea    [employer]  x20     Supine flexion x10                        Bent over row/ext x15 ea    SL ER  SL abd x20  x10 With assistance        4 way shoulder Iso's  HEP        Pt education Counseled in decreasing guarding mechanisms and allowing to relax with normal gait/arm swing pattern, shoulder safe zone, and lifting expectations  x10'    Manual     Gr I-II mobs for tissue reactivity     PROM-all planes 10' min supine ER   GR III-IV mobs for arthrokinematics Cervical/long axis distraction     Thoracic PA mobs     PNF for strengthening     PNF for agonist/antagonist inhibition          Pt education 10 min Provided Education in post-operative precautions/contraindications. Provided instruction in shoulder safe zone priniciples. Therapeutic Exercise and NMR EXR  [x] (99508) Provided verbal/tactile cueing for activities related to strengthening, flexibility, endurance, ROM  for improvements in scapular, scapulothoracic and UE control with self care, reaching, carrying, lifting, house/yardwork, driving/computer work. [x] (28609) Provided verbal/tactile cueing for activities related to improving balance, coordination, kinesthetic sense, posture, motor skill, proprioception  to assist with  scapular, scapulothoracic and UE control with self care, reaching, carrying, lifting, house/yardwork, driving/computer work.     Home Exercise Program:    [x] (23177) Reviewed/Progressed HEP activities related to strengthening, flexibility, endurance, ROM of scapular, scapulothoracic and UE control with self care, reaching, carrying, lifting, house/yardwork, driving/computer work  [x] (25497) Reviewed/Progressed HEP activities related to improving balance, coordination, kinesthetic sense, posture, motor skill, proprioception of scapular, scapulothoracic and UE control with self care, reaching, carrying, lifting, house/yardwork, driving/computer work      Manual Treatments:  PROM / STM / Oscillations-Mobs:  G-I, II, III, IV (PA's, Inf., Post.)  [x] (20032) Provided manual therapy to mobilize soft tissue/joints of cervical/CT, scapular GHJ and UE for the purpose of modulating pain, promoting relaxation,  increasing ROM, reducing/eliminating soft tissue swelling/inflammation/restriction, improving soft tissue extensibility and allowing for proper ROM for normal function with self care, reaching, carrying, lifting, house/yardwork, driving/computer work    Modalities:  Cp 10'  Charges:  Timed Code Treatment Minutes: 55   Total Treatment Minutes: 65     [] EVAL (LOW) 02293 (typically 20 minutes face-to-face)  [] EVAL (MOD) 05377 (typically 30 minutes face-to-face)  [] EVAL (HIGH) 76866 (typically 45 minutes face-to-face)  [] RE-EVAL     [x] WT(95601) x  2   [] Ionto  [x] NMR (39809) x  1   [] Vaso  [x] Manual (19823) x  1    [] Mech Traction  [] ES (unattended):   [] Other:     GOALS:  Therapist goals for Patient:   Short Term Goals: To be achieved in: 2 weeks  1. Independent in HEP and progression per patient tolerance, in order to prevent re-injury. 2. Patient will have a decrease in pain to facilitate improvement in movement, function, and ADLs as indicated by Functional Deficits.     Long Term Goals: To be achieved in: 12 weeks  1. Disability index score of 10% or less for the Quick Dash to assist with reaching prior level of function. 2. Patient will demonstrate increased AROM to equal the opposite side bilaterally to allow for proper joint functioning as indicated by patients Functional Deficits. 3. Patient will demonstrate an increase in strength to UE MMT scores of 5/5 to allow for proper functional mobility as indicated by patients Functional Deficits. 4. Patient will return to all transfers, work activities, and functional activities without increased symptoms or restriction. 5. Patient will have 0/10 pain with ADL's.  6. Patient stated goal: Return to work in 6-8 weeks per patient. Goals that are underlined signify the goal has been accomplished. Progression Towards Functional goals:  [x] Patient is progressing as expected towards functional goals listed. [] Progression is slowed due to complexities listed. [] Progression has been slowed due to co-morbidities.   [] Plan just implemented, too soon to assess goals progression  [] Other:     ASSESSMENT:  See eval     Treatment/Activity Tolerance:   [x] Patient tolerated treatment well [] Patient

## 2017-12-22 ENCOUNTER — OFFICE VISIT (OUTPATIENT)
Dept: ORTHOPEDIC SURGERY | Age: 47
End: 2017-12-22

## 2017-12-22 VITALS — HEIGHT: 73 IN | BODY MASS INDEX: 23.2 KG/M2 | WEIGHT: 175.04 LBS

## 2017-12-22 DIAGNOSIS — M77.8 TENDINITIS OF RIGHT SHOULDER: ICD-10-CM

## 2017-12-22 DIAGNOSIS — Z98.890 S/P ARTHROSCOPY OF SHOULDER: Primary | ICD-10-CM

## 2017-12-22 PROCEDURE — 99024 POSTOP FOLLOW-UP VISIT: CPT | Performed by: ORTHOPAEDIC SURGERY

## 2017-12-26 ENCOUNTER — HOSPITAL ENCOUNTER (OUTPATIENT)
Dept: PHYSICAL THERAPY | Age: 47
Discharge: HOME | End: 2017-12-26
Admitting: ORTHOPAEDIC SURGERY

## 2017-12-26 NOTE — FLOWSHEET NOTE
[address] and Sports [facility]    Physical Therapy Daily Treatment Note  Date:  2017    Patient Name:  Bethany Bruno  \"Fernando\"  :  1970  MRN: [id number]  Medical/Treatment Diagnosis Information:  · Diagnosis: R shoulder Compa / Neer procedure on 17  · Treatment Diagnosis: D56.91  Insurance/Certification information:  PT Insurance Information: [facility]  Physician Information:  Referring Practitioner: Mariella Galaviz of care signed (Y/N):     Date of Patient follow up with Physician:  17    G-Code (if applicable):      Date G-Code Applied:  2017       Progress Note: [x]  Yes  []  No      Latex Allergy:  [x]NO      []YES   Preferred Language for Healthcare:   [x]English       []other:     Visit # Insurance Allowable   8 Med nec     Pain level:  4/10     SUBJECTIVE:  Patient reports increased pain in the back of his shoulder today, states he may have \"over did it\" yesterday.      OBJECTIVE: See eval  Observation:   Test measurements:    Shoulder flexion: 170 °        Shoulder ER: 90 °   Patient educated on following:    RESTRICTIONS/PRECAUTIONS:     Exercises/Interventions:   Therapeutic Ex Wt/Sets/Reps/Hold Notes   Pulley 5 min    Wall slides x10    Cross arm stretch 5x20\"    IR belt stretch 5x10\"    Sleeper stretch 5x10\"         T-Band Rows  RTB x30    T Band Ext RTB x30    TB IR/ER Red x30 / Red x30    TB B ER YL x 20                     Wall push ups x20    Ball on trampolene  X20 4 way     SB flexion on table  X20 5\" hold         Cane supine flexion/ER 10x10\" [employer]  x20     Supine flexion x10                        Bent over row/ext x20 ea    SL ER  SL abd x20  x15                   Pt education Counseled in decreasing guarding mechanisms and allowing to relax with normal gait/arm swing pattern, shoulder safe zone, and lifting expectations  x10'    Manual     Gr I-II mobs for tissue reactivity     PROM-all planes 10' min supine ER   GR III-IV mobs for arthrokinematics     Cervical/long axis distraction     Thoracic PA mobs     PNF for strengthening     PNF for agonist/antagonist inhibition          Pt education 10 min Provided Education in post-operative precautions/contraindications. Provided instruction in shoulder safe zone priniciples. Therapeutic Exercise and NMR EXR  [x] (81421) Provided verbal/tactile cueing for activities related to strengthening, flexibility, endurance, ROM  for improvements in scapular, scapulothoracic and UE control with self care, reaching, carrying, lifting, house/yardwork, driving/computer work. [x] (36804) Provided verbal/tactile cueing for activities related to improving balance, coordination, kinesthetic sense, posture, motor skill, proprioception  to assist with  scapular, scapulothoracic and UE control with self care, reaching, carrying, lifting, house/yardwork, driving/computer work.     Home Exercise Program:    [x] (68224) Reviewed/Progressed HEP activities related to strengthening, flexibility, endurance, ROM of scapular, scapulothoracic and UE control with self care, reaching, carrying, lifting, house/yardwork, driving/computer work  [x] (25168) Reviewed/Progressed HEP activities related to improving balance, coordination, kinesthetic sense, posture, motor skill, proprioception of scapular, scapulothoracic and UE control with self care, reaching, carrying, lifting, house/yardwork, driving/computer work      Manual Treatments:  PROM / STM / Oscillations-Mobs:  G-I, II, III, IV (PA's, Inf., Post.)  [x] (41728) Provided manual therapy to mobilize soft tissue/joints of cervical/CT, scapular GHJ and UE for the purpose of modulating pain, promoting relaxation,  increasing ROM, reducing/eliminating soft tissue swelling/inflammation/restriction, improving soft tissue extensibility and allowing for proper ROM for normal function with self care, reaching, carrying, lifting, house/yardwork, driving/computer

## 2017-12-28 ENCOUNTER — HOSPITAL ENCOUNTER (OUTPATIENT)
Dept: PHYSICAL THERAPY | Age: 47
Discharge: HOME | End: 2017-12-28
Admitting: ORTHOPAEDIC SURGERY

## 2017-12-28 NOTE — FLOWSHEET NOTE
[address] [place] [address] and Sports [facility]    Physical Therapy Daily Treatment Note  Date:  2017    Patient Name:  Wilfred Haji  \"Fernando\"  :  1970  MRN: [id number]  Medical/Treatment Diagnosis Information:  · Diagnosis: R shoulder Compa / Neer procedure on 17  · Treatment Diagnosis: P71.70  Insurance/Certification information:  PT Insurance Information: [facility]  Physician Information:  Referring Practitioner: Nichole Hayes of care signed (Y/N):     Date of Patient follow up with Physician:  17    G-Code (if applicable):      Date G-Code Applied:  2017       Progress Note: [x]  Yes  []  No      Latex Allergy:  [x]NO      []YES   Preferred Language for Healthcare:   [x]English       []other:     Visit # Insurance Allowable   9 Med nec     Pain level:  4/10     SUBJECTIVE:  \"I pulled a floating stitch on my own the other day, shoulder seems to be feeling better\"     OBJECTIVE: See eval  Observation:   Test measurements:    Shoulder flexion: 170 °        Shoulder ER: 90 °   Patient educated on following:    RESTRICTIONS/PRECAUTIONS:     Exercises/Interventions:   Therapeutic Ex Wt/Sets/Reps/Hold Notes   Pulley 5 min    Wall slides x10    Cross arm stretch 5x20\"    IR belt stretch 5x10\"    Sleeper stretch 5x10\"         T-Band Rows  RTB x30    T Band Ext RTB x30    TB IR/ER Red x30 / Red x30    TB B ER YL x 20                     Wall push ups x20    Ball on trampolene  X20 4 way     SB flexion on table  X20 5\" hold         Cane supine flexion/ER 10x10\" ea    [employer]  x20     Supine flexion x10                        Bent over row/ext x30 ea    SL ER  SL abd x30  x30                   Pt education Counseled in decreasing guarding mechanisms and allowing to relax with normal gait/arm swing pattern, shoulder safe zone, and lifting expectations  x10'    Manual     Gr I-II mobs for tissue reactivity     PROM-all planes 10' min supine ER   GR III-IV mobs for arthrokinematics     Cervical/long axis distraction     Thoracic PA mobs     PNF for strengthening     PNF for agonist/antagonist inhibition          Pt education 10 min Provided Education in post-operative precautions/contraindications. Provided instruction in shoulder safe zone priniciples. Therapeutic Exercise and NMR EXR  [x] (73412) Provided verbal/tactile cueing for activities related to strengthening, flexibility, endurance, ROM  for improvements in scapular, scapulothoracic and UE control with self care, reaching, carrying, lifting, house/yardwork, driving/computer work. [x] (16456) Provided verbal/tactile cueing for activities related to improving balance, coordination, kinesthetic sense, posture, motor skill, proprioception  to assist with  scapular, scapulothoracic and UE control with self care, reaching, carrying, lifting, house/yardwork, driving/computer work.     Home Exercise Program:    [x] (25056) Reviewed/Progressed HEP activities related to strengthening, flexibility, endurance, ROM of scapular, scapulothoracic and UE control with self care, reaching, carrying, lifting, house/yardwork, driving/computer work  [x] (57269) Reviewed/Progressed HEP activities related to improving balance, coordination, kinesthetic sense, posture, motor skill, proprioception of scapular, scapulothoracic and UE control with self care, reaching, carrying, lifting, house/yardwork, driving/computer work      Manual Treatments:  PROM / STM / Oscillations-Mobs:  G-I, II, III, IV (PA's, Inf., Post.)  [x] (00461) Provided manual therapy to mobilize soft tissue/joints of cervical/CT, scapular GHJ and UE for the purpose of modulating pain, promoting relaxation,  increasing ROM, reducing/eliminating soft tissue swelling/inflammation/restriction, improving soft tissue extensibility and allowing for proper ROM for normal function with self care, reaching, carrying, lifting, house/yardwork, driving/computer work    Modalities: Cp 10'  Charges:  Timed Code Treatment Minutes: 55   Total Treatment Minutes: 65     [] EVAL (LOW) 30164 (typically 20 minutes face-to-face)  [] EVAL (MOD) 87891 (typically 30 minutes face-to-face)  [] EVAL (HIGH) 73055 (typically 45 minutes face-to-face)  [] RE-EVAL     [x] PP(67177) x  2   [] Ionto  [x] NMR (30136) x  1   [] Vaso  [x] Manual (24131) x  1    [] Mech Traction  [] ES (unattended):   [] Other:     GOALS:  Therapist goals for Patient:   Short Term Goals: To be achieved in: 2 weeks  1. Independent in HEP and progression per patient tolerance, in order to prevent re-injury. 2. Patient will have a decrease in pain to facilitate improvement in movement, function, and ADLs as indicated by Functional Deficits.     Long Term Goals: To be achieved in: 12 weeks  1. Disability index score of 10% or less for the Quick Dash to assist with reaching prior level of function. 2. Patient will demonstrate increased AROM to equal the opposite side bilaterally to allow for proper joint functioning as indicated by patients Functional Deficits. 3. Patient will demonstrate an increase in strength to UE MMT scores of 5/5 to allow for proper functional mobility as indicated by patients Functional Deficits. 4. Patient will return to all transfers, work activities, and functional activities without increased symptoms or restriction. 5. Patient will have 0/10 pain with ADL's.  6. Patient stated goal: Return to work in 6-8 weeks per patient. Goals that are underlined signify the goal has been accomplished. Progression Towards Functional goals:  [x] Patient is progressing as expected towards functional goals listed. [] Progression is slowed due to complexities listed. [] Progression has been slowed due to co-morbidities.   [] Plan just implemented, too soon to assess goals progression  [] Other:     ASSESSMENT:  See eval     Treatment/Activity Tolerance:   [x] Patient tolerated treatment well [] Patient limited by fatique  [] Patient limited by pain  [] Patient limited by other medical complications  [] Other:     Prognosis: [] Good [] Fair  [] Poor    Patient Requires Follow-up: [x] Yes  [] No    PLAN: See eval  [x] Continue per plan of care [] Alter current plan (see comments)  [] Plan of care initiated [] Hold pending MD visit [] Discharge    Electronically signed by: Brendon Calix PT, PTA

## 2018-01-01 ENCOUNTER — HOSPITAL ENCOUNTER (OUTPATIENT)
Dept: PHYSICAL THERAPY | Age: 48
Discharge: HOME | End: 2018-01-31
Attending: ORTHOPAEDIC SURGERY | Admitting: ORTHOPAEDIC SURGERY

## 2018-01-02 ENCOUNTER — HOSPITAL ENCOUNTER (OUTPATIENT)
Dept: PHYSICAL THERAPY | Age: 48
Discharge: HOME | End: 2018-01-02
Admitting: ORTHOPAEDIC SURGERY

## 2018-01-04 ENCOUNTER — HOSPITAL ENCOUNTER (OUTPATIENT)
Dept: PHYSICAL THERAPY | Age: 48
Discharge: HOME | End: 2018-01-04
Admitting: ORTHOPAEDIC SURGERY

## 2018-01-04 NOTE — FLOWSHEET NOTE
[facility], [place]    Physical Therapy Daily Treatment Note  Date:  2018    Patient Name:  Gerard Melissa  \"Fernando\"  :  1970  MRN: [id number]  Medical/Treatment Diagnosis Information:  · Diagnosis: R shoulder Compa / Neer procedure on 17  · Treatment Diagnosis: V71.34  Insurance/Certification information:  PT Insurance Information: [facility]  Physician Information:  Referring Practitioner: Latrice Silva of care signed (Y/N):     Date of Patient follow up with Physician:  17    G-Code (if applicable):      Date G-Code Applied:  2018       Progress Note: [x]  Yes  []  No      Latex Allergy:  [x]NO      []YES   Preferred Language for Healthcare:   [x]English       []other:     Visit # Insurance Allowable   2018 Med nec     Pain level:  4/10     SUBJECTIVE:  Patient reports that he reduced all his exercises to one time a day at home and feels that has helped take away some of the soreness but still getting that painful catch every so often.     OBJECTIVE: See eval  Observation:   Test measurements:    Shoulder flexion: 170 °        Shoulder ER: 90 °   Patient educated on following:    RESTRICTIONS/PRECAUTIONS:     Exercises/Interventions:   Therapeutic Ex Wt/Sets/Reps/Hold Notes   Pulley 5 min    Ball roll 2# x20    Cross arm stretch 5x20\"    IR belt stretch 5x20\"    Sleeper stretch 5x10\"    Corner stretch 5x15\"    Posture push     T-Band Rows  GTB x30    T Band Ext GTB x20 Palms forward - cue for retraction - hold 3 seconds   TB IR/ER GTB x30 / GTB x30    TB B ER Green x 20         PRE flexion / scaption x20 ea           Wall push ups x25    Ball on wall x30 4 way  Cue for scap retxn   SB flexion on table           Cane supine flexion/ER 10x10\" ea    Cane Press      Supine flexion x25    Supine punch 4# x30    Wt shifts 10x10\"         Bent over row/ext  Bent over HAB  Bent over Y 4# x30 ea  x20  x20    SL ER  SL abd 2# ecc lower listed. [] Progression is slowed due to complexities listed. [] Progression has been slowed due to co-morbidities.   [] Plan just implemented, too soon to assess goals progression  [] Other:     ASSESSMENT:  See eval     Treatment/Activity Tolerance:   [x] Patient tolerated treatment well [] Patient limited by fatique  [] Patient limited by pain  [] Patient limited by other medical complications  [x] Other: Patient fatigues quickly    Prognosis: [] Good [] Fair  [] Poor    Patient Requires Follow-up: [x] Yes  [] No    PLAN: See eval  [x] Continue per plan of care [] Alter current plan (see comments)  [] Plan of care initiated [] Hold pending MD visit [] Discharge    Electronically signed by: Shelia Martinez PTA

## 2018-01-08 ENCOUNTER — HOSPITAL ENCOUNTER (OUTPATIENT)
Dept: PHYSICAL THERAPY | Age: 48
Discharge: HOME | End: 2018-01-08
Admitting: ORTHOPAEDIC SURGERY

## 2018-01-08 NOTE — FLOWSHEET NOTE
[address] [facility]    [facility] Therapy Daily Treatment Note  Date:  2018    Patient Name:  Bibiana Gutierrez  \"Fernando\"  :  1970  MRN: [id number]  Medical/Treatment Diagnosis Information:  · Diagnosis: R shoulder Compa / Neer procedure on 17  · Treatment Diagnosis: R23.82  Insurance/Certification information:  PT Insurance Information: [facility]  Physician Information:  Referring Practitioner: Rubie Goldmann of care signed (Y/N):     Date of Patient follow up with Physician:  17    G-Code (if applicable):      Date G-Code Applied:  2018       Progress Note: [x]  Yes  []  No      Latex Allergy:  [x]NO      []YES   Preferred Language for Healthcare:   [x]English       []other:     Visit # Insurance Allowable   2018 - 3        2017 - 9 Med nec     Pain level:  10     SUBJECTIVE:  Patient reports that shoulder only caught one time all weekend and when he focuses on his shoulder blade retraction then function improves.     OBJECTIVE: See eval  Observation:   Test measurements:    Shoulder flexion: 170 °        Shoulder ER: 90 °   Patient educated on following:    RESTRICTIONS/PRECAUTIONS:     Exercises/Interventions:   Therapeutic Ex Wt/Sets/Reps/Hold Notes   Pulley 5 min    Ball roll 4# x20    Cross arm stretch 5x20\"    IR belt stretch 5x20\"    Sleeper stretch 5x10\"    Corner stretch 5x15\"    Posture push 5x10\"    OH ball dribble 3x30 PGB   Body Blade 2x15\" ea Elbow @ side vert/horiz/push-pull   Bicep curls 5# x10 ea of 3 way         Ball drops x20 ea I, Y, T YL foam ball             T-Band Rows  BTB x30    T Band Ext BTB x20 Palms forward - cue for retraction - hold 3 seconds   TB IR/ER GTB x30 / GTB x30              PRE flexion / scaption / abd 2# x20 ea           Table push ups / Wall push up x10 / x20    Ball on wall x30 4 way  Cue for scap retxn             Supine flexion      Supine punch 5# x30    Wt shifts 10x10\"         Bent over row/ext  Bent over

## 2018-01-10 ENCOUNTER — HOSPITAL ENCOUNTER (OUTPATIENT)
Dept: PHYSICAL THERAPY | Age: 48
Discharge: HOME | End: 2018-01-10
Admitting: ORTHOPAEDIC SURGERY

## 2018-01-10 NOTE — FLOWSHEET NOTE
x20    Ball on wall Red x30 4 way  Cue for scap retxn             Supine flexion      Supine punch 5# x30    Wt shifts 10x10\"         Bent over row/ext  Bent over HAB    Bent over Y 5# x30 / 4# x20  Prone drop and catch Green x30  Prone drop and catch [id number]    SL ER  SL abd 3# ecc lower x20  3# x20                   Pt education Counseled in decreasing guarding mechanisms and allowing to relax with normal gait/arm swing pattern, shoulder safe zone, and lifting expectations  x10'    Manual     Gr I-II mobs for tissue reactivity     PROM-all planes 10' min supine ER   GR III-IV mobs for arthrokinematics     Cervical/long axis distraction     Thoracic PA mobs     PNF for strengthening     PNF for agonist/antagonist inhibition          Pt education 10 min Provided Education in post-operative precautions/contraindications. Provided instruction in shoulder safe zone priniciples. Therapeutic Exercise and NMR EXR  [x] (66155) Provided verbal/tactile cueing for activities related to strengthening, flexibility, endurance, ROM  for improvements in scapular, scapulothoracic and UE control with self care, reaching, carrying, lifting, house/yardwork, driving/computer work. [x] (02229) Provided verbal/tactile cueing for activities related to improving balance, coordination, kinesthetic sense, posture, motor skill, proprioception  to assist with  scapular, scapulothoracic and UE control with self care, reaching, carrying, lifting, house/yardwork, driving/computer work.     Home Exercise Program:    [x] (71211) Reviewed/Progressed HEP activities related to strengthening, flexibility, endurance, ROM of scapular, scapulothoracic and UE control with self care, reaching, carrying, lifting, house/yardwork, driving/computer work  [x] (68914) Reviewed/Progressed HEP activities related to improving balance, coordination, kinesthetic sense, posture, motor skill, proprioception of scapular, scapulothoracic and UE control with self care, reaching, carrying, lifting, house/yardwork, driving/computer work      Manual Treatments:  PROM / STM / Oscillations-Mobs:  G-I, II, III, IV (PA's, Inf., Post.)  [x] (18806) Provided manual therapy to mobilize soft tissue/joints of cervical/CT, scapular GHJ and UE for the purpose of modulating pain, promoting relaxation,  increasing ROM, reducing/eliminating soft tissue swelling/inflammation/restriction, improving soft tissue extensibility and allowing for proper ROM for normal function with self care, reaching, carrying, lifting, house/yardwork, driving/computer work    Modalities:  Cp 10'  Charges:  Timed Code Treatment Minutes: 55   Total Treatment Minutes: 65     [] EVAL (LOW) 03210 (typically 20 minutes face-to-face)  [] EVAL (MOD) 40749 (typically 30 minutes face-to-face)  [] EVAL (HIGH) 06265 (typically 45 minutes face-to-face)  [] RE-EVAL     [x] HZ(38717) x  2   [] Ionto  [x] NMR (23844) x  1   [] Vaso  [x] Manual (81442) x  1    [] Mech Traction  [] ES (unattended):   [] Other:     GOALS:  Therapist goals for Patient:   Short Term Goals: To be achieved in: 2 weeks  1. Independent in HEP and progression per patient tolerance, in order to prevent re-injury. 2. Patient will have a decrease in pain to facilitate improvement in movement, function, and ADLs as indicated by Functional Deficits.     Long Term Goals: To be achieved in: 12 weeks  1. Disability index score of 10% or less for the Quick Dash to assist with reaching prior level of function. 2. Patient will demonstrate increased AROM to equal the opposite side bilaterally to allow for proper joint functioning as indicated by patients Functional Deficits. 3. Patient will demonstrate an increase in strength to UE MMT scores of 5/5 to allow for proper functional mobility as indicated by patients Functional Deficits. 4. Patient will return to all transfers, work activities, and functional activities without increased symptoms or restriction.

## 2018-01-15 ENCOUNTER — HOSPITAL ENCOUNTER (OUTPATIENT)
Dept: PHYSICAL THERAPY | Age: 48
Discharge: HOME | End: 2018-01-15
Admitting: ORTHOPAEDIC SURGERY

## 2018-01-15 NOTE — FLOWSHEET NOTE
[facility]    Physical Therapy Daily Treatment Note  Date:  1/15/2018    Patient Name:  Demarco Stanton  \"Fernando\"  :  1970  MRN: [id number]  Medical/Treatment Diagnosis Information:  · Diagnosis: R shoulder Compa / Neer procedure on 17  · Treatment Diagnosis: C76.62  Insurance/Certification information:  PT Insurance Information: [employer]  Physician Information:  Referring Practitioner: Usha Shea of care signed (Y/N):     Date of Patient follow up with Physician:  17    G-Code (if applicable):      Date G-Code Applied:  1/15/2018       Progress Note: [x]  Yes  []  No      Latex Allergy:  [x]NO      []YES   Preferred Language for Healthcare:   [x]English       []other:     Visit # Insurance Allowable   2018 Med nec     Pain level:  4/10     SUBJECTIVE: Patient reports that he has been working it at home but he has remained sore.       OBJECTIVE: See eval  Observation:   Test measurements:    Shoulder flexion: 170 °        Shoulder ER: 90 °   Patient educated on following:    RESTRICTIONS/PRECAUTIONS:     Exercises/Interventions:   Therapeutic Ex Wt/Sets/Reps/Hold Notes   Pulley 5 min    Ball roll 6# x20    Cross arm stretch 5x20\"    IR belt stretch 5x20\"    Sleeper stretch 5x10\"    Corner stretch 5x15\"    Posture push 5x10\"    OH ball dribble 3x30 PGB   Body Blade 2x20\" ea Elbow @ side vert/horiz/push-pull   Bicep curls 5# x10 ea of 3 way         Ball drops x20 ea I, Y, T YL foam ball        OH tricep ext with cable column 40# x30    T-Band Rows  90# x30 Mid  90# High    T Band W Blue 2x10    T Band HAB BTB 2x10    T Band Ext BTB x30 Palms forward - cue for retraction - hold 3 seconds   SB walkout with push up x10    TB IR/ER GTB x30 / GTB x30    Single arm curl with OH lift 5# x20    Bent over row with cable column 20# x20    Upright row with cable column 30# x10    Bicep curl with OH lift 5# x10 10# x10   PRE flexion /

## 2018-01-17 ENCOUNTER — HOSPITAL ENCOUNTER (OUTPATIENT)
Dept: PHYSICAL THERAPY | Age: 48
Discharge: HOME | End: 2018-01-17
Admitting: ORTHOPAEDIC SURGERY

## 2018-01-17 NOTE — FLOWSHEET NOTE
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  Insurance/Certification information:  PT Insurance Information: [facility]  Physician Information:  Referring Practitioner: Trista Escamilla of care signed (Y/N):     Date of Patient follow up with Physician:  17    G-Code (if applicable):      Date G-Code Applied:  2018       Progress Note: [x]  Yes  []  No      Latex Allergy:  [x]NO      []YES   Preferred Language for Healthcare:   [x]English       []other:     Visit # Insurance Allowable   2018 Med nec     Pain level:  4/10     SUBJECTIVE:  Patient reports that he is more sore and doesn't seem to be getting anywhere in strength.     OBJECTIVE: See eval  Observation:   Test measurements:    Shoulder flexion: 170 °        Shoulder ER: 90 °   Patient educated on following:    RESTRICTIONS/PRECAUTIONS:     Exercises/Interventions:   Therapeutic Ex Wt/Sets/Reps/Hold Notes   Pulley 5 min    Ball roll 6# x20    Cross arm stretch 5x20\"    IR belt stretch 5x20\"    Sleeper stretch 5x10\"    Corner stretch 5x15\"    Posture push 5x10\"    OH ball dribble 3x30 PGB   Body Blade 2x20\" ea Elbow @ side vert/horiz/push-pull   Bicep curls 5# x10 ea of 3 way         Ball drops x20 ea I, Y, T YL foam ball        OH tricep ext with cable column 45# x20    T-Band Rows  90# x30 Mid  90# High    T Band W Blue 3x10    T Band HAB BTB 2x10    T Band Ext BTB x30 Palms forward - cue for retraction - hold 3 seconds   SB walkout with push up x10    TB IR/ER GTB x30    Single arm curl with OH lift 5# x20    Bent over row with cable column 20# x20    Upright row with cable column 30# x10    Bicep curl with OH lift 5# x10 10# x10   PRE flexion / scaption / abd 2# x20 ea           Table push ups / Wall push up     Ball on wall Red x30 4 way  Cue for scap retxn             Supine flexion      Supine punch 5# x30    Wt shifts 10x10\"         Bent over row/ext  Bent over HAB    Bent over Y 5# x30 / 4# x20  Prone drop and catch Green x30  Prone drop and catch [id number]    SL ER  SL abd 3# ecc lower x20  3# x20                   Pt education Counseled in decreasing guarding mechanisms and allowing to relax with normal gait/arm swing pattern, shoulder safe zone, and lifting expectations  x10'    Manual     Gr I-II mobs for tissue reactivity     PROM-all planes 10' min supine ER   GR III-IV mobs for arthrokinematics     Cervical/long axis distraction     Thoracic PA mobs     PNF for strengthening     PNF for agonist/antagonist inhibition          Pt education 10 min Provided Education in post-operative precautions/contraindications. Provided instruction in shoulder safe zone priniciples. Therapeutic Exercise and NMR EXR  [x] (91100) Provided verbal/tactile cueing for activities related to strengthening, flexibility, endurance, ROM  for improvements in scapular, scapulothoracic and UE control with self care, reaching, carrying, lifting, house/yardwork, driving/computer work. [x] (36050) Provided verbal/tactile cueing for activities related to improving balance, coordination, kinesthetic sense, posture, motor skill, proprioception  to assist with  scapular, scapulothoracic and UE control with self care, reaching, carrying, lifting, house/yardwork, driving/computer work.     Home Exercise Program:    [x] (77346) Reviewed/Progressed HEP activities related to strengthening, flexibility, endurance, ROM of scapular, scapulothoracic and UE control with self care, reaching, carrying, lifting, house/yardwork, driving/computer work  [x] (48198) Reviewed/Progressed HEP activities related to improving balance, coordination, kinesthetic sense, posture, motor skill, and functional activities without increased symptoms or restriction. 5. Patient will have 0/10 pain with ADL's.  6. Patient stated goal: Return to work in 6-8 weeks per patient. Goals that are underlined signify the goal has been accomplished. Progression Towards Functional goals:  [x] Patient is progressing as expected towards functional goals listed. [] Progression is slowed due to complexities listed. [] Progression has been slowed due to co-morbidities.   [] Plan just implemented, too soon to assess goals progression  [] Other:     ASSESSMENT:  See eval     Treatment/Activity Tolerance:   [x] Patient tolerated treatment well [] Patient limited by fatique  [] Patient limited by pain  [] Patient limited by other medical complications  [] Other:     Prognosis: [] Good [] Fair  [] Poor    Patient Requires Follow-up: [x] Yes  [] No    PLAN: See eval  [x] Continue per plan of care [] Alter current plan (see comments)  [] Plan of care initiated [] Hold pending MD visit [] Discharge    Electronically signed by: Rabia oCllins PTA

## 2018-01-22 ENCOUNTER — OFFICE VISIT (OUTPATIENT)
Dept: ORTHOPEDIC SURGERY | Age: 48
End: 2018-01-22

## 2018-01-22 ENCOUNTER — HOSPITAL ENCOUNTER (OUTPATIENT)
Dept: PHYSICAL THERAPY | Age: 48
Discharge: HOME | End: 2018-01-22
Admitting: ORTHOPAEDIC SURGERY

## 2018-01-22 VITALS
HEART RATE: 89 BPM | WEIGHT: 175.04 LBS | SYSTOLIC BLOOD PRESSURE: 111 MMHG | DIASTOLIC BLOOD PRESSURE: 79 MMHG | HEIGHT: 73 IN | BODY MASS INDEX: 23.2 KG/M2

## 2018-01-22 DIAGNOSIS — Z98.890 S/P ARTHROSCOPY OF SHOULDER: ICD-10-CM

## 2018-01-22 DIAGNOSIS — M75.101 TEAR OF RIGHT ROTATOR CUFF, UNSPECIFIED TEAR EXTENT: Primary | ICD-10-CM

## 2018-01-22 PROCEDURE — 99024 POSTOP FOLLOW-UP VISIT: CPT | Performed by: PHYSICIAN ASSISTANT

## 2018-01-22 NOTE — PROGRESS NOTES
History of present illness: The patient returns today after right shoulder arthroscopy with Neer acromioplasty, debridement and Compa procedure performed on 11/28/2017. He also had a removal of a superficial lipoma at the time of surgery. Pain control has been satisfactory with oral medications. He has been working very regularly in therapy. Overall, he has been noticing improvements. He has made good progress with range of motion. He has two limiting factors. The 1st is his anterior shoulder pain with forward flexion, he will occasionally feel a pop which gets some pain. He is also experiencing pain at the distal triceps which again has been limiting his progress in therapy. He isn't working on scapular substitution. Pain Assessment  Location of Pain: Shoulder  Location Modifiers: Right  Severity of Pain: 8  Quality of Pain: Aching, Dull, Sharp  Duration of Pain: Persistent  Frequency of Pain: Constant  Aggravating Factors: Bending, Stretching, Straightening, Exercise  Limiting Behavior: Yes  Relieving Factors: Rest  Result of Injury: Yes  Work-Related Injury: No  Are there other pain locations you wish to document?: No]      Physical examination: The patient displays restored range of motion. . Incisions are well-healed with no signs of infection. Neurovascular exam is intact. He does have some mild global weakness with the rotator cuff as expected. He also has tenderness at the distal tricep insertion. Full range of motion of the elbow. Assessment/plan:   Overall the patient is doing well, I have encouraged him to really work on scapular stabilization with forward flexion and I think this will limit his discomfort anteriorly. I also would like him to limit tricep extensions as this seems to aggravate his pain. Instead, he could work on tricep strengthening with his arm in full extension. He does feel capable of returning to work on Monday as a .   I have encouraged him to continue with physical therapy even just once a week for another 3-4 weeks to work on strengthening. He was also given recommendations to modify his activities I do believe he is doing too much in between therapy.

## 2018-01-22 NOTE — PROGRESS NOTES
[address] [place] [address], [address] [place] [address] [address] [facility], [address], [place], [address] [place] [address]  Phone: [phone], [phone]    Date: 2018          Patient Name; :  Francisca Leong; 1970   Dx/ICD Code: R shoulder Compa / Neer procedure on 17  Treatment Diagnosis: M75.81       Physician: Deb Rincon        Total PT Visits: 16     Measures Previous Current   Pain (0-10)  Day 0-2  Night 7/10   Disability % 100% 36%   PROM  WNL   AROM flexion  140   Strength     flexion  4-   ER  4-     Specific Functional Improvements & Impressions:  Patient has been struggling with shoulder discomfort and episodes of catching. His pain is much worse at night and describes it as \"intolerable\". He had made great strides in PT after focusing on a good scapular stabilization program but regressed last week. He fatigues easily with repetition and continues to have swelling in the anterior shoulder. We have educated in activity modification and he did tolerate therapy better today; however I am concerned with his lack of strength and endurance and also with his pain. Treatment to date:  [x] Therapeutic Exercise including HEP instruction, [x] Neuromuscular Re-education  [x] Manual Therapy & Modalities to include  [] Cold/hotpack, []Electrical Stimulation, []Ultrasound,  [] Traction, [] Other                         ?    Assessment:  LE Functional deficiencies/Impairments:     []Decreased core/proximal hip strength and neuromuscular control -Reduced overall functional level with mobility and lifting    []Decreased LE functional strength- Reduced overall functional mobility    []Difficulty /Pain with sitting/standing- Reduced overall functional mobility    []Pain/difficulty with transfers/bed mobility- Reduced overall functional mobility  []Reduced balance/proprioceptive control- Reduced overall functional level with mobility and gait, possible falls risk   []Pain/difficulty with ambulation- Reduced overall functional mobility  [] Unable to perform self care tasks due to pain and dysfunction- Reduced ADL status  [] Pain/difficulty with occupational or household work- Reduced ADL status    []Unable to perform sport/recreational activity due to pain and dysfunction    UE Functional deficiencies/Impairments   [x]Decreased RC/scapular/core strength and neuromuscular control - Reduced overall functional level with carrying /lifting   []Noted cervical/thoracic/GHJ/joint hypomobility- Reduced overall functional level with carrying /lifting    [x]Decreased UE functional strength- Reduced overall functional mobility with carrying/lifting/self care   []Pain/difficulty with driving and/or computer work- Reduced overall functional  level    []Pain/difficulty associated with self care tasks- Reduced overall functional level and ADL status   [x]Pain/difficulty with lifting/reaching/carrying - Reduced overall functional level with carrying and lifting  []Pain/difficulty with occupational or household work- Reduced ADL status   []Unable to perform sport/recreational activity due to pain and dysfunction    Long Term Goals: To be achieved in: 12 weeks  1. Disability index score of 10% or less for the Quick Dash to assist with reaching prior level of function. 2. Patient will demonstrate increased AROM to equal the opposite side bilaterally to allow for proper joint functioning as indicated by patients Functional Deficits. 3. Patient will demonstrate an increase in strength to UE MMT scores of 5/5 to allow for proper functional mobility as indicated by patients Functional Deficits. 4. Patient will return to all transfers, work activities, and functional activities without increased symptoms or restriction.      5.  Patient will have 0/10 pain with ADL's.  6. Patient stated goal: Return to work in 6-8 weeks per patient. Christelle Bales           G-Code if applicable noted on 0/05/1091 based on following test/measure:   PT

## 2018-01-22 NOTE — FLOWSHEET NOTE
reaching, carrying, lifting, house/yardwork, driving/computer work      Manual Treatments:  PROM / STM / Oscillations-Mobs:  G-I, II, III, IV (PA's, Inf., Post.)  [x] (97246) Provided manual therapy to mobilize soft tissue/joints of cervical/CT, scapular GHJ and UE for the purpose of modulating pain, promoting relaxation,  increasing ROM, reducing/eliminating soft tissue swelling/inflammation/restriction, improving soft tissue extensibility and allowing for proper ROM for normal function with self care, reaching, carrying, lifting, house/yardwork, driving/computer work    Modalities:  Cp 10'  Charges:  Timed Code Treatment Minutes: 55   Total Treatment Minutes: 65     [] EVAL (LOW) 97403 (typically 20 minutes face-to-face)  [] EVAL (MOD) 05241 (typically 30 minutes face-to-face)  [] EVAL (HIGH) 32670 (typically 45 minutes face-to-face)  [] RE-EVAL     [x] QZ(75731) x  2   [] Ionto  [x] NMR (42985) x  2   [] Vaso  [] Manual (59801) x       [] Mech Traction  [] ES (unattended):   [] Other:     GOALS:  Therapist goals for Patient:   Short Term Goals: To be achieved in: 2 weeks  1. Independent in HEP and progression per patient tolerance, in order to prevent re-injury. 2. Patient will have a decrease in pain to facilitate improvement in movement, function, and ADLs as indicated by Functional Deficits.     Long Term Goals: To be achieved in: 12 weeks  1. Disability index score of 10% or less for the Quick Dash to assist with reaching prior level of function. 2. Patient will demonstrate increased AROM to equal the opposite side bilaterally to allow for proper joint functioning as indicated by patients Functional Deficits. 3. Patient will demonstrate an increase in strength to UE MMT scores of 5/5 to allow for proper functional mobility as indicated by patients Functional Deficits. 4. Patient will return to all transfers, work activities, and functional activities without increased symptoms or restriction.       5. Patient will have 0/10 pain with ADL's.  6. Patient stated goal: Return to work in 6-8 weeks per patient. Goals that are underlined signify the goal has been accomplished. Progression Towards Functional goals:  [x] Patient is progressing as expected towards functional goals listed. [] Progression is slowed due to complexities listed. [] Progression has been slowed due to co-morbidities.   [] Plan just implemented, too soon to assess goals progression  [] Other:     ASSESSMENT:  See eval     Treatment/Activity Tolerance:   [x] Patient tolerated treatment well [] Patient limited by fatique  [] Patient limited by pain  [] Patient limited by other medical complications  [] Other:     Prognosis: [] Good [] Fair  [] Poor    Patient Requires Follow-up: [x] Yes  [] No    PLAN: See eval  [x] Continue per plan of care [] Alter current plan (see comments)  [] Plan of care initiated [] Hold pending MD visit [] Discharge    Electronically signed by: Carolyn Esquivel PTA

## 2018-01-24 ENCOUNTER — HOSPITAL ENCOUNTER (OUTPATIENT)
Dept: PHYSICAL THERAPY | Age: 48
Discharge: HOME | End: 2018-01-24
Admitting: ORTHOPAEDIC SURGERY

## 2018-01-24 NOTE — FLOWSHEET NOTE
without increased symptoms or restriction. 5. Patient will have 0/10 pain with ADL's.  6. Patient stated goal: Return to work in 6-8 weeks per patient. Goals that are underlined signify the goal has been accomplished. Progression Towards Functional goals:  [x] Patient is progressing as expected towards functional goals listed. [] Progression is slowed due to complexities listed. [] Progression has been slowed due to co-morbidities.   [] Plan just implemented, too soon to assess goals progression  [] Other:     ASSESSMENT:  See eval     Treatment/Activity Tolerance:   [x] Patient tolerated treatment well [] Patient limited by fatique  [] Patient limited by pain  [] Patient limited by other medical complications  [] Other:     Prognosis: [] Good [] Fair  [] Poor    Patient Requires Follow-up: [x] Yes  [] No    PLAN: See eval  [x] Continue per plan of care [] Alter current plan (see comments)  [] Plan of care initiated [] Hold pending MD visit [] Discharge    Electronically signed by: Vee Echols PTA

## 2018-02-01 ENCOUNTER — HOSPITAL ENCOUNTER (OUTPATIENT)
Dept: PHYSICAL THERAPY | Age: 48
Discharge: HOME | End: 2018-02-28
Attending: ORTHOPAEDIC SURGERY | Admitting: ORTHOPAEDIC SURGERY

## 2020-02-22 ENCOUNTER — OFFICE VISIT (OUTPATIENT)
Dept: ORTHOPEDIC SURGERY | Age: 50
End: 2020-02-22
Payer: COMMERCIAL

## 2020-02-22 VITALS
BODY MASS INDEX: 24.92 KG/M2 | HEART RATE: 84 BPM | SYSTOLIC BLOOD PRESSURE: 130 MMHG | HEIGHT: 73 IN | WEIGHT: 188 LBS | DIASTOLIC BLOOD PRESSURE: 88 MMHG

## 2020-02-22 PROCEDURE — 99213 OFFICE O/P EST LOW 20 MIN: CPT | Performed by: NURSE PRACTITIONER

## 2020-02-22 NOTE — PROGRESS NOTES
Latissimus tender with palpation  -There is not weakness with supraspinatus testing.  -There is not pain with supraspinatus testing.   -Yergason Test negative.    -Drop Arm Test negative. -Apprehension Test negative. -Cross Arm Test negative.    -Supraspinatus Test negative.  -Shoulder ROM-full range of motion  -Slight winging of the left scapula    Contralateral Exam:  -No obvious deformities  -No abrasions or cellulitis noted, NVI   -Full ROM   -No joint laxity  -no palpable tenderness noted    Neurological:   -There is not any complaints of numbness and tingling.    -Motor and sensory is at median, radial and ulnar nerve distributions. -NVI to upper extremities bilaterally. Skin:  No abrasions, lesions, cellulitic changes, obvious deformities noted     Xray:  4 view (AP/Lat/Axillary/Y) of left shoulder show: No acute fractures or deformities noted; slightly high riding humeral head    Assessment:  left latissimus strain; possible scapular dyskinesis    Plan: Patient was advised to continue resting the area, he may continue with lower extremity exercises but should avoid upper extremity at this time. He may take over-the-counter anti-inflammatories as needed. He should apply ice to the area and will follow-up with Dr. Diana Riley within the next week or so. No orders of the defined types were placed in this encounter.

## 2020-03-05 ENCOUNTER — OFFICE VISIT (OUTPATIENT)
Dept: ORTHOPEDIC SURGERY | Age: 50
End: 2020-03-05
Payer: COMMERCIAL

## 2020-03-05 VITALS — BODY MASS INDEX: 24.92 KG/M2 | WEIGHT: 188.05 LBS | HEIGHT: 73 IN

## 2020-03-05 PROCEDURE — G8420 CALC BMI NORM PARAMETERS: HCPCS | Performed by: ORTHOPAEDIC SURGERY

## 2020-03-05 PROCEDURE — G8484 FLU IMMUNIZE NO ADMIN: HCPCS | Performed by: ORTHOPAEDIC SURGERY

## 2020-03-05 PROCEDURE — 1036F TOBACCO NON-USER: CPT | Performed by: ORTHOPAEDIC SURGERY

## 2020-03-05 PROCEDURE — 3017F COLORECTAL CA SCREEN DOC REV: CPT | Performed by: ORTHOPAEDIC SURGERY

## 2020-03-05 PROCEDURE — G8427 DOCREV CUR MEDS BY ELIG CLIN: HCPCS | Performed by: ORTHOPAEDIC SURGERY

## 2020-03-05 PROCEDURE — 99213 OFFICE O/P EST LOW 20 MIN: CPT | Performed by: ORTHOPAEDIC SURGERY

## 2023-01-22 ENCOUNTER — APPOINTMENT (OUTPATIENT)
Dept: GENERAL RADIOLOGY | Age: 53
End: 2023-01-22
Payer: COMMERCIAL

## 2023-01-22 ENCOUNTER — HOSPITAL ENCOUNTER (EMERGENCY)
Age: 53
Discharge: HOME | End: 2023-01-22
Attending: EMERGENCY MEDICINE
Payer: COMMERCIAL

## 2023-01-22 VITALS
DIASTOLIC BLOOD PRESSURE: 77 MMHG | RESPIRATION RATE: 21 BRPM | SYSTOLIC BLOOD PRESSURE: 118 MMHG | WEIGHT: 180 LBS | HEART RATE: 94 BPM | HEIGHT: 73 IN | OXYGEN SATURATION: 93 % | BODY MASS INDEX: 23.86 KG/M2

## 2023-01-22 DIAGNOSIS — R07.89 ATYPICAL CHEST PAIN: Primary | ICD-10-CM

## 2023-01-22 DIAGNOSIS — F41.1 ANXIETY STATE: ICD-10-CM

## 2023-01-22 LAB
A/G RATIO: 1.5 (ref 1.1–2.2)
ALBUMIN SERPL-MCNC: 4.4 G/DL (ref 3.4–5)
ALP BLD-CCNC: 76 U/L (ref 40–129)
ALT SERPL-CCNC: 21 U/L (ref 10–40)
AMPHETAMINE SCREEN, URINE: NORMAL
ANION GAP SERPL CALCULATED.3IONS-SCNC: 11 MMOL/L (ref 3–16)
AST SERPL-CCNC: 22 U/L (ref 15–37)
BARBITURATE SCREEN URINE: NORMAL
BASOPHILS ABSOLUTE: 0 K/UL (ref 0–0.2)
BASOPHILS RELATIVE PERCENT: 0.2 %
BENZODIAZEPINE SCREEN, URINE: NORMAL
BILIRUB SERPL-MCNC: 0.3 MG/DL (ref 0–1)
BILIRUBIN URINE: NEGATIVE
BLOOD, URINE: NEGATIVE
BUN BLDV-MCNC: 27 MG/DL (ref 7–20)
CALCIUM SERPL-MCNC: 8.7 MG/DL (ref 8.3–10.6)
CANNABINOID SCREEN URINE: NORMAL
CHLORIDE BLD-SCNC: 97 MMOL/L (ref 99–110)
CLARITY: CLEAR
CO2: 26 MMOL/L (ref 21–32)
COCAINE METABOLITE SCREEN URINE: NORMAL
COLOR: YELLOW
CREAT SERPL-MCNC: 1.4 MG/DL (ref 0.9–1.3)
EOSINOPHILS ABSOLUTE: 0 K/UL (ref 0–0.6)
EOSINOPHILS RELATIVE PERCENT: 0.5 %
ETHANOL: NORMAL MG/DL (ref 0–0.08)
FENTANYL SCREEN, URINE: NORMAL
GFR SERPL CREATININE-BSD FRML MDRD: 60 ML/MIN/{1.73_M2}
GLUCOSE BLD-MCNC: 107 MG/DL (ref 70–99)
GLUCOSE URINE: NEGATIVE MG/DL
HCT VFR BLD CALC: 43.5 % (ref 40.5–52.5)
HEMOGLOBIN: 14.8 G/DL (ref 13.5–17.5)
KETONES, URINE: ABNORMAL MG/DL
LEUKOCYTE ESTERASE, URINE: NEGATIVE
LYMPHOCYTES ABSOLUTE: 1.4 K/UL (ref 1–5.1)
LYMPHOCYTES RELATIVE PERCENT: 22.1 %
Lab: NORMAL
MAGNESIUM: 2.2 MG/DL (ref 1.8–2.4)
MCH RBC QN AUTO: 30.1 PG (ref 26–34)
MCHC RBC AUTO-ENTMCNC: 34 G/DL (ref 31–36)
MCV RBC AUTO: 88.6 FL (ref 80–100)
METHADONE SCREEN, URINE: NORMAL
MICROSCOPIC EXAMINATION: ABNORMAL
MONOCYTES ABSOLUTE: 0.8 K/UL (ref 0–1.3)
MONOCYTES RELATIVE PERCENT: 11.5 %
NEUTROPHILS ABSOLUTE: 4.3 K/UL (ref 1.7–7.7)
NEUTROPHILS RELATIVE PERCENT: 65.7 %
NITRITE, URINE: NEGATIVE
OPIATE SCREEN URINE: NORMAL
OXYCODONE URINE: NORMAL
PDW BLD-RTO: 13.6 % (ref 12.4–15.4)
PH UA: 6
PH UA: 6.5 (ref 5–8)
PHENCYCLIDINE SCREEN URINE: NORMAL
PLATELET # BLD: 283 K/UL (ref 135–450)
PMV BLD AUTO: 7.2 FL (ref 5–10.5)
POTASSIUM REFLEX MAGNESIUM: 3.4 MMOL/L (ref 3.5–5.1)
PROTEIN UA: NEGATIVE MG/DL
RBC # BLD: 4.91 M/UL (ref 4.2–5.9)
SODIUM BLD-SCNC: 134 MMOL/L (ref 136–145)
SPECIFIC GRAVITY UA: 1.02 (ref 1–1.03)
TOTAL PROTEIN: 7.3 G/DL (ref 6.4–8.2)
TROPONIN: <0.01 NG/ML
TROPONIN: <0.01 NG/ML
URINE REFLEX TO CULTURE: ABNORMAL
URINE TYPE: ABNORMAL
UROBILINOGEN, URINE: 0.2 E.U./DL
WBC # BLD: 6.5 K/UL (ref 4–11)

## 2023-01-22 PROCEDURE — 93005 ELECTROCARDIOGRAM TRACING: CPT | Performed by: EMERGENCY MEDICINE

## 2023-01-22 PROCEDURE — 84484 ASSAY OF TROPONIN QUANT: CPT

## 2023-01-22 PROCEDURE — 82077 ASSAY SPEC XCP UR&BREATH IA: CPT

## 2023-01-22 PROCEDURE — 71045 X-RAY EXAM CHEST 1 VIEW: CPT

## 2023-01-22 PROCEDURE — 36415 COLL VENOUS BLD VENIPUNCTURE: CPT

## 2023-01-22 PROCEDURE — 81003 URINALYSIS AUTO W/O SCOPE: CPT

## 2023-01-22 PROCEDURE — 83735 ASSAY OF MAGNESIUM: CPT

## 2023-01-22 PROCEDURE — 6370000000 HC RX 637 (ALT 250 FOR IP): Performed by: EMERGENCY MEDICINE

## 2023-01-22 PROCEDURE — 80307 DRUG TEST PRSMV CHEM ANLYZR: CPT

## 2023-01-22 PROCEDURE — 85025 COMPLETE CBC W/AUTO DIFF WBC: CPT

## 2023-01-22 PROCEDURE — 99285 EMERGENCY DEPT VISIT HI MDM: CPT

## 2023-01-22 PROCEDURE — 80053 COMPREHEN METABOLIC PANEL: CPT

## 2023-01-22 RX ORDER — HYDROXYZINE HYDROCHLORIDE 25 MG/1
25 TABLET, FILM COATED ORAL ONCE
Status: COMPLETED | OUTPATIENT
Start: 2023-01-22 | End: 2023-01-22

## 2023-01-22 RX ORDER — DIAZEPAM 5 MG/1
5 TABLET ORAL ONCE
Status: COMPLETED | OUTPATIENT
Start: 2023-01-22 | End: 2023-01-22

## 2023-01-22 RX ORDER — ASPIRIN 81 MG/1
324 TABLET, CHEWABLE ORAL ONCE
Status: COMPLETED | OUTPATIENT
Start: 2023-01-22 | End: 2023-01-22

## 2023-01-22 RX ADMIN — HYDROXYZINE HYDROCHLORIDE 25 MG: 25 TABLET ORAL at 20:09

## 2023-01-22 RX ADMIN — DIAZEPAM 5 MG: 5 TABLET ORAL at 21:39

## 2023-01-22 RX ADMIN — ASPIRIN 324 MG: 81 TABLET, CHEWABLE ORAL at 20:08

## 2023-01-22 ASSESSMENT — PAIN - FUNCTIONAL ASSESSMENT: PAIN_FUNCTIONAL_ASSESSMENT: NONE - DENIES PAIN

## 2023-01-23 LAB
EKG ATRIAL RATE: 104 BPM
EKG DIAGNOSIS: NORMAL
EKG P AXIS: 63 DEGREES
EKG P-R INTERVAL: 140 MS
EKG Q-T INTERVAL: 340 MS
EKG QRS DURATION: 102 MS
EKG QTC CALCULATION (BAZETT): 447 MS
EKG R AXIS: 16 DEGREES
EKG T AXIS: 47 DEGREES
EKG VENTRICULAR RATE: 104 BPM

## 2023-01-23 PROCEDURE — 93010 ELECTROCARDIOGRAM REPORT: CPT | Performed by: INTERNAL MEDICINE

## 2023-01-23 NOTE — ED PROVIDER NOTES
[facility] Emergency Department      CHIEF COMPLAINT  Chest Pain (Pt complaining of CP x 15 minutes. States \" my heart is just doing what it wants\". States it feels like his chest is squeezing. Denies n/v. Is a daily drinker but has not drank today. )      HISTORY OF PRESENT ILLNESS  Gerardo Lindo is a 47 y.o. male with a history of anxiety presents with CP. He was getting to work where he is a  and his truck wasn't parked in the right spot. He was upset and aggravated. He then calmed down and was driving home with his wife and felt like the muscles in his left chest were spasming. He then developed sharp pains in his chest. He became very anxious which he says is a common issue for him he denies SOB or abd pain. No weakness or numbness in extremities. No fevers or cough no leg swelling. The spasms are coming in waves and each episode is assoc with sharp l chest pains. He is extremely anxious here and he states he hates coming to the doctor and feels anxious like this whenever he is at the doctor. .   No other complaints, modifying factors or associated symptoms. History obtained from the patient. I have reviewed the following from the nursing documentation.     Past Medical History:   Diagnosis Date    Arthritis      Past Surgical History:   Procedure Laterality Date    BICEPS TENDON REPAIR      FRACTURE SURGERY Left     leg    HERNIA REPAIR      SHOULDER ARTHROSCOPY  11/28/2017    SHOULDER SURGERY Left     x2    WISDOM TOOTH EXTRACTION       Family History   Problem Relation Age of Onset    Cancer Mother     Diabetes Father      Social History     Socioeconomic History    Marital status:      Spouse name: Not on file    Number of children: Not on file    Years of education: Not on file    Highest education level: Not on file   Occupational History    Not on file   Tobacco Use    Smoking status: Former    Smokeless tobacco: Never   Substance and Sexual Activity    Alcohol use: Yes     Alcohol/week: 12.0 standard drinks     Types: 12 Cans of beer per week     Comment: 12 week    Drug use: No    Sexual activity: Not on file   Other Topics Concern    Not on file   Social History Narrative    Not on file     Social Determinants of Health     Financial Resource Strain: Not on file   Food Insecurity: Not on file   Transportation Needs: Not on file   Physical Activity: Not on file   Stress: Not on file   Social Connections: Not on file   Intimate Partner Violence: Not on file   Housing Stability: Not on file     No current facility-administered medications for this encounter. Current Outpatient Medications   Medication Sig Dispense Refill    HYDROcodone-acetaminophen (NORCO) 5-325 MG per tablet Take 1-2 tablets by mouth every 4-6 hours as needed for pain following surgery. (Patient not taking: Reported on 1/22/2023) 40 tablet 0     Allergies   Allergen Reactions    Percocet [Oxycodone-Acetaminophen] Other (See Comments)     hallucinations    Tramadol Other (See Comments)     dizziness       REVIEW OF SYSTEMS    Unless otherwise stated in this report, this patient's positive and negative responses for review of systems (constitutional, eyes, ENT, cardiovascular, respiratory, gastrointestinal, neurological, genitourinary, musculoskeletal, integument systems and systems related to the presenting problem) are either stated in the preceding paragraph, were not pertinent or were negative for the symptoms and/or complaints related to the medical problem. PHYSICAL EXAM  /87   Pulse 85   Resp 22   Ht 6' 1\" (1.854 m)   Wt 180 lb (81.6 kg)   SpO2 96%   BMI 23.75 kg/m²   GENERAL APPEARANCE: Awake and alert. Cooperative. No acute distress. HEAD: Normocephalic. Atraumatic. EYES: PERRL. EOM's grossly intact. ENT: Mucous membranes are moist.   NECK: Supple, trachea midline. HEART: RRR. LUNGS: Respirations unlabored. CTAB. Good air exchange. No wheezes, rales, or rhonchi.   Speaking comfortably in full sentences. ABDOMEN:  Soft. Non-distended. Non-tender. No guarding or rebound. EXTREMITIES: No peripheral edema. MAEE. No acute deformities. SKIN: Warm, dry and intact. No acute rashes. NEUROLOGICAL: Alert and oriented X 3. CN II-XII grossly intact. Strength 5/5, sensation intact. Tremulous. PSYCHIATRIC: Anxious appearing    LABS  I have reviewed all labs for this visit.    Results for orders placed or performed during the hospital encounter of 01/22/23   CBC with Auto Differential   Result Value Ref Range    WBC 6.5 4.0 - 11.0 K/uL    RBC 4.91 4.20 - 5.90 M/uL    Hemoglobin 14.8 13.5 - 17.5 g/dL    Hematocrit 43.5 40.5 - 52.5 %    MCV 88.6 80.0 - 100.0 fL    MCH 30.1 26.0 - 34.0 pg    MCHC 34.0 31.0 - 36.0 g/dL    RDW 13.6 12.4 - 15.4 %    Platelets 948 519 - 575 K/uL    MPV 7.2 5.0 - 10.5 fL    Neutrophils % 65.7 %    Lymphocytes % 22.1 %    Monocytes % 11.5 %    Eosinophils % 0.5 %    Basophils % 0.2 %    Neutrophils Absolute 4.3 1.7 - 7.7 K/uL    Lymphocytes Absolute 1.4 1.0 - 5.1 K/uL    Monocytes Absolute 0.8 0.0 - 1.3 K/uL    Eosinophils Absolute 0.0 0.0 - 0.6 K/uL    Basophils Absolute 0.0 0.0 - 0.2 K/uL   Comprehensive Metabolic Panel w/ Reflex to MG   Result Value Ref Range    Sodium 134 (L) 136 - 145 mmol/L    Potassium reflex Magnesium 3.4 (L) 3.5 - 5.1 mmol/L    Chloride 97 (L) 99 - 110 mmol/L    CO2 26 21 - 32 mmol/L    Anion Gap 11 3 - 16    Glucose 107 (H) 70 - 99 mg/dL    BUN 27 (H) 7 - 20 mg/dL    Creatinine 1.4 (H) 0.9 - 1.3 mg/dL    Est, Glom Filt Rate 60 (A) >60    Calcium 8.7 8.3 - 10.6 mg/dL    Total Protein 7.3 6.4 - 8.2 g/dL    Albumin 4.4 3.4 - 5.0 g/dL    Albumin/Globulin Ratio 1.5 1.1 - 2.2    Total Bilirubin 0.3 0.0 - 1.0 mg/dL    Alkaline Phosphatase 76 40 - 129 U/L    ALT 21 10 - 40 U/L    AST 22 15 - 37 U/L   Troponin   Result Value Ref Range    Troponin <0.01 <0.01 ng/mL   Ethanol   Result Value Ref Range    Ethanol Lvl None Detected mg/dL   Drug screen multi urine   Result Value Ref Range    Amphetamine Screen, Urine Neg Negative <1000ng/mL    Barbiturate Screen, Ur Neg Negative <200 ng/mL    Benzodiazepine Screen, Urine Neg Negative <200 ng/mL    Cannabinoid Scrn, Ur Neg Negative <50 ng/mL    Cocaine Metabolite Screen, Urine Neg Negative <300 ng/mL    Opiate Scrn, Ur Neg Negative <300 ng/mL    PCP Screen, Urine Neg Negative <25 ng/mL    Methadone Screen, Urine Neg Negative <300 ng/mL    Oxycodone Urine Neg Negative <100 ng/ml    FENTANYL SCREEN, URINE Neg Negative <5 ng/mL    pH, UA 6.0     Drug Screen Comment: see below    Magnesium   Result Value Ref Range    Magnesium 2.20 1.80 - 2.40 mg/dL   Urinalysis with Reflex to Culture    Specimen: Urine   Result Value Ref Range    Color, UA Yellow Straw/Yellow    Clarity, UA Clear Clear    Glucose, Ur Negative Negative mg/dL    Bilirubin Urine Negative Negative    Ketones, Urine TRACE (A) Negative mg/dL    Specific Gravity, UA 1.020 1.005 - 1.030    Blood, Urine Negative Negative    pH, UA 6.5 5.0 - 8.0    Protein, UA Negative Negative mg/dL    Urobilinogen, Urine 0.2 <2.0 E.U./dL    Nitrite, Urine Negative Negative    Leukocyte Esterase, Urine Negative Negative    Microscopic Examination Not Indicated     Urine Type NotGiven     Urine Reflex to Culture Not Indicated    Troponin   Result Value Ref Range    Troponin <0.01 <0.01 ng/mL   EKG 12 Lead   Result Value Ref Range    Ventricular Rate 104 BPM    Atrial Rate 104 BPM    P-R Interval 140 ms    QRS Duration 102 ms    Q-T Interval 340 ms    QTc Calculation (Bazett) 447 ms    P Axis 63 degrees    R Axis 16 degrees    T Axis 47 degrees    Diagnosis       Sinus tachycardiaIncomplete right bundle branch blockPossible Inferior infarct , age undeterminedAbnormal ECGNo previous ECGs available       EKG  The Ekg interpreted as interpreted by me:  sinus tachycardia, neqy=764  with a rate of 104  Axis is   Normal  QTc is  normal  Incomplete right bundle branch block.      No specific ST-T wave changes appreciated. No evidence of acute ischemia. No prior EKG for comparison. Cardiac Monitoring: The cardiac monitor revealed normal sinus rhythm as interpreted by me. The cardiac monitor was ordered secondary to the patient's complaint of chest pain and to monitor the patient for dysrhythmia. RADIOLOGY  X-RAYS: ALL IMAGES INCLUDING PLAIN FILMS, CT, ULTRASOUND AND MRI HAVE BEEN READ BY THE RADIOLOGIST. XR CHEST PORTABLE   Final Result   No acute cardiopulmonary process. I have personally reviewed Xray and Ultrasound images and radiology confirms the interpretation:  Chest x-ray with no pneumonia or pneumothorax. Medications administered. (Dose and Route):    Valium 5 mg by mouth, hydroxyzine 25 mg by mouth, aspirin 324 mg by mouth        Sepsis:  Is this patient to be included in the SEP-1 Core Measure due to severe sepsis or septic shock? No   Exclusion criteria - the patient is NOT to be included for SEP-1 Core Measure due to: Infection is not suspected     Heart Score: HEART SCORE:  History: +2 high suspicion, +1 moderate, 0 low  EKG: +2 significant ST depression, +1 nonspec changes, 0 normal  Age: +2 >65, +1 45-65, 0 <45  Risk factors: +2 >3 or known CAD, +1 1-2, 0 none (factors = HLD, HTN, DM, tobacco, obesity, FHx)  Troponin: +2 >3x normal limit, +1 1-3x normal limit, 0 neg    Heart score: 2          ED COURSE/MDM:  Patient seen and evaluated. Here the patient is afebrile with normal vitals signs. Old records reviewed: No prior records reviewed. Diagnoses affirmatively addressed, consideration of tests, treatments & admission, including shared decision making:    Here he is very anxious, tremulous. He admittedly has severe anxiety at the doctor. He has heart score of 2 and is not SOB or hypoxic. I do not suspect PE and have very low suspicion for ACS. EKG shows NSR, no ischemia. Trop neg x 2 sets. Labs wnl. CXR wnl.  He eels much better after valium and atarax here. Symptoms resolved. I do not think he needs to be admitted. I will refer to PCP and rec outpatient stress test. . No pain in his back and pain is not constant, I do not suspect aortic dissection. Consultation summary: No consultations placed. Social determinants of health: No barriers. Labs and imaging reviewed and results discussed with patient. Patient was reassessed as noted above . Plan of care discussed with patient. Patient in agreement with plan. Strict return precautions have been given. I completed a HEART Score to screen for Major Adverse Cardiac Event (MACE) in this patient. The evidence indicates that the patient is very low risk for MACE and this is consistent with my clinical intuition. The risk of further workup or hospitalization for MACE is likely higher than the risk of the patient having a MACE. It is, therefore, in the patient's best interest not to do additional emergent testing or to be hospitalized for MACE. I have discussed with the patient my clinical impression and the result of the HEART Score to screen for MACE, as well as the risks of further testing and hospitalization. I estimate there is LOW risk for PULMONARY EMBOLISM, ACUTE CORONARY SYNDROME, OR THORACIC AORTIC DISSECTION, thus I consider the discharge disposition reasonable. Nash Kruger and I have discussed the diagnosis and risks, and we agree with discharging home to follow-up with their primary doctor. We also discussed returning to the Emergency Department immediately if new or worsening symptoms occur. We have discussed the symptoms which are most concerning (e.g., bloody sputum, fever, worsening pain or shortness of breath, vomiting) that necessitate immediate return. Patient was given scripts for the following medications. I counseled patient how to take these medications.    New Prescriptions    No medications on file       No prescriptions given.      CLINICAL IMPRESSION  1. Atypical chest pain    2. Anxiety state        Blood pressure 113/87, pulse 85, resp. rate 22, height 6' 1\" (1.854 m), weight 180 lb (81.6 kg), SpO2 96 %.    DISPOSITION  Giovanni Gnozalez was discharged to home in stable condition.    I, Katelynn Grullon MD am the primary clinician of record.    (Please note this note was completed with a voice recognition program.  Efforts were made to edit the dictations but occasionally words are mis-transcribed.)        Katelynn Grullon MD  01/24/23 204

## 2023-01-23 NOTE — ED NOTES
Attempted to place PIV at this time. Pt. With large movement away from PIV causing removal after blood draw.      Wyatt Ferrell RN  01/22/23 1310

## 2023-01-23 NOTE — DISCHARGE INSTRUCTIONS
Your evaluation in the ER today was unremarkable. Your EKG, heart enzymes, lab work and chest x-ray were normal.  You were very anxious here. You were given a muscle relaxer here and something for anxiety. I think you are safe to follow-up with your primary care provider as an outpatient. If you feel you are worsening at any point or develop new symptoms or concern you, return to the ER immediately.

## 2024-05-08 NOTE — PROGRESS NOTES
[facility]  DIVISION OF OTOLARYNGOLOGY- HEAD & NECK SURGERY  CONSULT    Patient Name: Giovanni Gonzalez  Medical Record Number:  [id number]  Primary Care Physician:  No primary care provider on file.  Date of Consultation: 5/9/2024    Chief Complaint:   Chief Complaint   Patient presents with    New Patient     Patient has a cyst on face. Noticed for a few years.       HISTORY OF PRESENT ILLNESS  Giovanni is a(n) 54 y.o. male who presents for evaluation of a cyst on his nose.  He states it is been present for many years but is getting larger.  He did have it biopsied which came back as noncancerous but signs of sun damage.  It does not hurt and does not bleed.  Patient Active Problem List   Diagnosis    Stiffness of joint, not elsewhere classified, ankle and foot    Capsulitis of right shoulder    Arthritis of right acromioclavicular joint    S/P arthroscopy of shoulder     Past Surgical History:   Procedure Laterality Date    BICEPS TENDON REPAIR      FRACTURE SURGERY Left     leg    HERNIA REPAIR      SHOULDER ARTHROSCOPY  11/28/2017    SHOULDER SURGERY Left     x2    WISDOM TOOTH EXTRACTION       Family History   Problem Relation Age of Onset    Cancer Mother     Diabetes Father      Social History     Socioeconomic History    Marital status:      Spouse name: Not on file    Number of children: Not on file    Years of education: Not on file    Highest education level: Not on file   Occupational History    Not on file   Tobacco Use    Smoking status: Former    Smokeless tobacco: Never   Substance and Sexual Activity    Alcohol use: Yes     Alcohol/week: 12.0 standard drinks of alcohol     Types: 12 Cans of beer per week     Comment: 12 week    Drug use: No    Sexual activity: Not on file   Other Topics Concern    Not on file   Social History Narrative    Not on file     Social Determinants of Health     Financial Resource Strain: Not on file   Food Insecurity: Not on file   Transportation Needs: Not on file

## 2024-05-09 ENCOUNTER — OFFICE VISIT (OUTPATIENT)
Dept: ENT CLINIC | Age: 54
End: 2024-05-09
Payer: COMMERCIAL

## 2024-05-09 VITALS
RESPIRATION RATE: 16 BRPM | HEART RATE: 89 BPM | SYSTOLIC BLOOD PRESSURE: 134 MMHG | HEIGHT: 73 IN | BODY MASS INDEX: 23.86 KG/M2 | WEIGHT: 180 LBS | DIASTOLIC BLOOD PRESSURE: 90 MMHG

## 2024-05-09 DIAGNOSIS — J34.2 DEVIATED NASAL SEPTUM: Primary | ICD-10-CM

## 2024-05-09 DIAGNOSIS — J34.2: ICD-10-CM

## 2024-05-09 PROCEDURE — G8420 CALC BMI NORM PARAMETERS: HCPCS | Performed by: STUDENT IN AN ORGANIZED HEALTH CARE EDUCATION/TRAINING PROGRAM

## 2024-05-09 PROCEDURE — 3017F COLORECTAL CA SCREEN DOC REV: CPT | Performed by: STUDENT IN AN ORGANIZED HEALTH CARE EDUCATION/TRAINING PROGRAM

## 2024-05-09 PROCEDURE — 1036F TOBACCO NON-USER: CPT | Performed by: STUDENT IN AN ORGANIZED HEALTH CARE EDUCATION/TRAINING PROGRAM

## 2024-05-09 PROCEDURE — 99203 OFFICE O/P NEW LOW 30 MIN: CPT | Performed by: STUDENT IN AN ORGANIZED HEALTH CARE EDUCATION/TRAINING PROGRAM

## 2024-05-09 PROCEDURE — G8427 DOCREV CUR MEDS BY ELIG CLIN: HCPCS | Performed by: STUDENT IN AN ORGANIZED HEALTH CARE EDUCATION/TRAINING PROGRAM

## 2024-05-09 ASSESSMENT — ENCOUNTER SYMPTOMS
FACIAL SWELLING: 1
EYE PAIN: 0
RHINORRHEA: 0
SHORTNESS OF BREATH: 0
NAUSEA: 0
VOMITING: 0
COUGH: 0